# Patient Record
Sex: FEMALE | Race: WHITE | Employment: OTHER | URBAN - METROPOLITAN AREA
[De-identification: names, ages, dates, MRNs, and addresses within clinical notes are randomized per-mention and may not be internally consistent; named-entity substitution may affect disease eponyms.]

---

## 2017-05-31 PROBLEM — N20.0 NEPHROLITHIASIS: Status: ACTIVE | Noted: 2017-05-31

## 2019-06-12 ENCOUNTER — HOSPITAL ENCOUNTER (OUTPATIENT)
Dept: PHYSICAL THERAPY | Age: 69
Setting detail: THERAPIES SERIES
Discharge: HOME | End: 2019-06-12
Payer: MEDICARE

## 2019-06-12 PROCEDURE — 97140 MANUAL THERAPY 1/> REGIONS: CPT

## 2019-06-12 PROCEDURE — 97161 PT EVAL LOW COMPLEX 20 MIN: CPT

## 2019-06-12 PROCEDURE — 97110 THERAPEUTIC EXERCISES: CPT

## 2019-06-12 NOTE — FLOWSHEET NOTE
Outpatient Physical Therapy     [] Daily Treatment Note   [] Progress Note   [] Discharge Note      Date:  2019    Patient Name:  Brendan Wright        :  1950    Restrictions/Precautions:      Diagnosis:  Rot cuff repair. Massive 3/14/19    Treatment Diagnosis:      Insurance/Certification information:  Medicare and first choice    Referring Physician:      Plan of care signed (Y/N):      Visit# / total visits:  1    Pain level: /10     Subjective:   19 reports she is able to do most things that she wants to do at home. She is not able to reach very high. She is just starting to be able to lay on her left side for a short time. She has not been lifting with her left side. She is able to carry a bag of groceries but does not put them on the counter. She is able to mow 10 acres with the zero turn mower.     Pain    Patient describes pain to be very intermit. Sometimes she has some pain when she moves the wrong way. Patient reports 0 /10 pain at rest,  5-6/10 pain with movement, seldom, pain is only for a few seconds. .   Worsened by -  Lifting, reaching too high   Improved by - avoidance  Current Functional Limitations:  Cant lift much or reach too high  PLOF:  indep  Pt. unable to tolerate laying on side of pain, fixing hair, reaching overhead, washing opposite shoulder, nor tucking in shirt due to pain.     Patient goal for therapy:   Reach higher, lift more, get stronger.       Exercises:   Exercise/Equipment Resistance/Repetitions Other comments     19 explained course and role of PT       Scapular ex- center sqz and back pocket shrugs Sets of 10      Wand ex for stretching:                         flexion 3x5              ER from forehead to pillow       Prone sh ext 3x5      Prone row 3x5                                                                                             Therapeutic Exercise:  30 min    Group Therapy:      Home Exercise Program:  Given HO    Therapeutic Activity:      Neuromuscular Re-education:      Gait:      Manual Therapy:  PROM, inf glide, IR/ER spins    Canalith Repositioning Procedure:       Modalities:  CP x 10 min    Timed Code Treatment Minutes:  45    Total Treatment Minutes:  70    Medicare Cap Total YTD:  170.00    Treatment/Activity Tolerance:    [x] Patient tolerated treatment well [] Patient limited by fatigue   [] Patient limited by pain [] Patient limited by other medical complications   [] Other:     Prognosis: [x] Good [] Fair  [] Poor    Patient Requires Follow-up:  [x] Yes  [] No    Plan: [x] Continue per plan of care [] Alter current plan (see comments)   [] Plan of care initiated [] Hold pending MD visit [] Discharge    Plan for Next Session:  Manual tech, PRES , stretching for ROM  See Progress Note: [] Yes  [x] No       Next due:         Electronically signed by:  Mitali Luo PT [id number]      Outpatient Physical Therapy  Progress Note    Date:  2019    Patient Name:  Jeremías Ernst      :  1950    Restrictions/Precautions:      Diagnosis:      Treatment Diagnosis:      Insurance/Certification information:      Referring Physician:      Plan of care signed (Y/N):      Visit# / total visits:  /    Pain level: /10    Progress Note covers period from: To date on this note.       Subjective:         Objective:  Observation:  Test measurements:       GCODEs and Functional Outcome Measure:            Assessment:  Summary:   Patient's response to treatment:      Goals:  · Progress toward previous goals:      Plan:  ·     Electronically Signed by: Mitali Luo PT

## 2019-06-12 NOTE — PROGRESS NOTES
expected. Prognosis: [x]    Good []    Fair []    Poor    GOALS  GCode:    Short Term Goals (  4  weeks) Long Term Goals ( 8  weeks)   1). Establish HEP 1). (I) HEP   2). pain scale 0-3/10 2). pain scale 0-3/10   3). AROM:  145 flx/abd  ER to T2 3). AROM to 155 flx   4). strength to 4/5. 4). strength to 4+/5   5). improve use 50% 5). improve use 75%   6). 6). PLAN OF CARE    To see patient  2  x/week for  4-8  weeks for the following treatment interventions:   Therapeutic Exercise   Progressive Resistive Exercise   Modalities of Choice (Heat/Cold/US/EStim/Ionto)   Home Exercise Program   Manual Techniques/Mobilization   Postural Reeducation    Thank you for the referral of this patient.       Timed Code Treatment Minutes:  45 minutes      Total Treatment Time: 70 minutes    Carmelina Kehr PT license #[id number]

## 2019-06-17 ENCOUNTER — HOSPITAL ENCOUNTER (OUTPATIENT)
Dept: PHYSICAL THERAPY | Age: 69
Setting detail: THERAPIES SERIES
Discharge: HOME | End: 2019-06-17
Payer: MEDICARE

## 2019-06-17 NOTE — FLOWSHEET NOTE
Physical Therapy  Cancellation/No-show Note  Patient Name:  Stephenie Whitmore  :  1950   Date:  2019  Cancels to Date: 1     For today  and .   No-shows to Date: 0    For today's appointment patient:  [x]  Cancelled  []  Rescheduled appointment  []  No-show     Reason given by patient:  []  Patient ill  []  Conflicting appointment  []  No transportation    []  Conflict with work  []  No reason given  []  Other:     Comments:      Electronically signed by:  Stella Fernandez, [id number]

## 2019-06-19 ENCOUNTER — APPOINTMENT (OUTPATIENT)
Dept: PHYSICAL THERAPY | Age: 69
End: 2019-06-19
Payer: MEDICARE

## 2019-06-26 ENCOUNTER — HOSPITAL ENCOUNTER (OUTPATIENT)
Dept: PHYSICAL THERAPY | Age: 69
Setting detail: THERAPIES SERIES
Discharge: HOME | End: 2019-06-26
Payer: MEDICARE

## 2019-06-26 PROCEDURE — 97150 GROUP THERAPEUTIC PROCEDURES: CPT

## 2019-06-26 PROCEDURE — 97140 MANUAL THERAPY 1/> REGIONS: CPT

## 2019-06-26 PROCEDURE — 97110 THERAPEUTIC EXERCISES: CPT

## 2019-07-01 ENCOUNTER — HOSPITAL ENCOUNTER (OUTPATIENT)
Dept: PHYSICAL THERAPY | Age: 69
Setting detail: THERAPIES SERIES
Discharge: HOME | End: 2019-07-01
Payer: MEDICARE

## 2019-07-01 PROCEDURE — 97140 MANUAL THERAPY 1/> REGIONS: CPT

## 2019-07-01 PROCEDURE — 97110 THERAPEUTIC EXERCISES: CPT

## 2019-07-01 NOTE — PROGRESS NOTES
Outpatient Physical Therapy     [x] Daily Treatment Note   [x] Progress Note 19, bottom of this page. [] Discharge Note      Date:  2019    Patient Name:  Simone Cage        :  1950    Restrictions/Precautions:  none    Diagnosis:  Left Rot cuff repair. Massive 3/14/19    Treatment Diagnosis:  Same. Sh pain, decr functional use    Insurance/Certification information:  Medicare and first choice    Referring Physician:  Dr. France Prsabinant of care signed (Y/N):      Visit# / total visits:  4.        schedualed 1x wk. Pain level: 0/10 currently,   3-4/10 at worst     Subjective:   19 states she has done some shoveling and raking, kept her arm close to her body. 15.5 weeks post op   19  Reports she is sore today with certain movements in shoulder joint     Reports she is very active at home  19  Patient reports L shoulder is feeling really good. Only having pain with lifting light objects around the house. No reports of L shoulder pain that prevents sleeping. Has not been compliant with HEP due to being busy with house guests. Also, a little sore today for moving wood at her home. 19 reports she is able to do most things that she wants to do at home. She is not able to reach very high. She is just starting to be able to lay on her left side for a short time. She has not been lifting with her left side. She is able to carry a bag of groceries but does not put them on the counter. She is able to mow 10 acres with the zero turn mower.     Pain    Patient describes pain to be very intermit. Sometimes she has some pain when she moves the wrong way. Patient reports 0 /10 pain at rest,  5-6/10 pain with movement, seldom, pain is only for a few seconds. .   Worsened by -  Lifting, reaching too high   Improved by - avoidance  Current Functional Limitations:  Cant lift much or reach too high  PLOF:  indep  Pt. unable to tolerate laying on side of pain, fixing hair, reaching overhead, washing opposite shoulder, nor tucking in shirt due to pain.     Patient goal for therapy:   Reach higher, lift more, get stronger.     14 weeks post surgery on 6/20/19    PROM:  L shoulder flex 150, abd 170, ER 72, IR 75 in 90 deg abd    Exercises:   7/1/19 is 15.5 weeks post op. Massive tear. Needs ROM/ strength/stability  Exercise/Equipment Resistance/Repetitions Other comments     6/12/19 explained course and role of PT       Scapular ex- center sqz and back pocket shrugs Sets of 10      Wand ex for stretching:                         Flexion, abd, ER x5 each direction              ER from forehead to pillow       Prone sh ext 2x15, 1#      Prone row 2x15, 1#     rhythmic stabilization  x1 min in supine with shoulder flex to 90 deg, manual resistance      PNF, D1,2 x10 with light manual resistance        Figure 8 X10, in supine, 1#       Ceiling punches 2x10, 1#      Supine chest press X10, 1#     7/1/19 added PNF in supine to HEP and table slide flx sstretch to stading on her kitchen islnd        incr prone ex to 2 then 3#       NV- t bd for mid.low, IR,ER. ER on her side with 1# if abale. Therapeutic Exercise:   15 min. Reviewed and progressed exercises as noted above with new handout given. Group Therapy:        Home Exercise Program:  Given HO    Therapeutic Activity:      Neuromuscular Re-education:      Gait:      Manual Therapy: x11 min. PROM, inf glide, IR/ER spins    Canalith Repositioning Procedure:       Modalities:  Declined      Timed Code Treatment Minutes:        Total Treatment Minutes:  13         1 man 1  Ex     Medicare Cap Total YTD:   325.00    Treatment/Activity Tolerance:    [x] Patient tolerated treatment well with no adverse reactions noted [] Patient limited by fatigue   [] Patient limited by pain [] Patient limited by other medical complications   [x] Other:  Pain 0/10 after treatment    Prognosis: [x] Good [] Fair  []

## 2019-07-03 ENCOUNTER — HOSPITAL ENCOUNTER (OUTPATIENT)
Dept: PHYSICAL THERAPY | Age: 69
Setting detail: THERAPIES SERIES
Discharge: HOME | End: 2019-07-03
Payer: MEDICARE

## 2019-07-03 PROCEDURE — 97140 MANUAL THERAPY 1/> REGIONS: CPT

## 2019-07-03 PROCEDURE — 97110 THERAPEUTIC EXERCISES: CPT

## 2019-07-03 PROCEDURE — 97150 GROUP THERAPEUTIC PROCEDURES: CPT

## 2019-07-08 ENCOUNTER — HOSPITAL ENCOUNTER (OUTPATIENT)
Dept: PHYSICAL THERAPY | Age: 69
Setting detail: THERAPIES SERIES
Discharge: HOME | End: 2019-07-08
Payer: MEDICARE

## 2019-07-08 NOTE — FLOWSHEET NOTE
Physical Therapy  Cancellation/No-show Note  Patient Name:  Zheng Underwood  :  1950   Date:  2019  Cancels to Date: 2  No-shows to Date: 0    For today's appointment patient:  [x]  Cancelled  []  Rescheduled appointment  []  No-show     Reason given by patient:  []  Patient ill  []  Conflicting appointment  []  No transportation    []  Conflict with work  []  No reason given  []  Other:     Comments:  Having N/T in her arm so she is going to MD  Electronically signed byHetal Villagran, [id number]

## 2019-07-10 ENCOUNTER — HOSPITAL ENCOUNTER (OUTPATIENT)
Dept: PHYSICAL THERAPY | Age: 69
Setting detail: THERAPIES SERIES
Discharge: HOME | End: 2019-07-10
Payer: MEDICARE

## 2019-07-10 PROCEDURE — 97150 GROUP THERAPEUTIC PROCEDURES: CPT

## 2019-07-10 PROCEDURE — 97140 MANUAL THERAPY 1/> REGIONS: CPT

## 2019-07-10 NOTE — FLOWSHEET NOTE
Outpatient Physical Therapy     [x] Daily Treatment Note   [] Progress Note 19, bottom of this page. [] Discharge Note      Date:  7/10/2019    Patient Name:  Meme Carranza        :  1950    Restrictions/Precautions:  none    Diagnosis:  Left Rot cuff repair. Massive 3/14/19    Treatment Diagnosis:  Same. Sh pain, decr functional use    Insurance/Certification information:  Medicare and first choice    Referring Physician:  Dr. Geovany Matamoros of care signed (Y/N):      Visit# / total visits:  6        Scheduled 1x wk. Pain level: 1/10 currently,   2/10 at worst W/N/T    Subjective:    7/10/19 reports no n/t today but upper arm is really sore. She said she saw the MD and he thinks she just pinched a nerve in neck or elbow. Reports she did lift a heavy casarole dish into shelf over the weekend. 7/3/19   7/1/19 states she has done some shoveling and raking, kept her arm close to her body. 15.5 weeks post op  19  Reports she is sore today with certain movements in shoulder joint     Reports she is very active at home  19  Patient reports L shoulder is feeling really good. Only having pain with lifting light objects around the house. No reports of L shoulder pain that prevents sleeping. Has not been compliant with HEP due to being busy with house guests. Also, a little sore today for moving wood at her home. 19 reports she is able to do most things that she wants to do at home. She is not able to reach very high. She is just starting to be able to lay on her left side for a short time. She has not been lifting with her left side. She is able to carry a bag of groceries but does not put them on the counter. She is able to mow 10 acres with the zero turn mower.     Pain    Patient describes pain to be very intermit. Sometimes she has some pain when she moves the wrong way.   Patient reports 0 /10 pain at rest,  5-6/10 pain with movement, seldom, pain is only for a few needs to continue PT 1x wk for another 4-6 weeks for ROM and strength.   ·     Electronically Signed by:  Calderon Bal Y Yehuda [phone]

## 2019-07-15 ENCOUNTER — HOSPITAL ENCOUNTER (OUTPATIENT)
Dept: GENERAL RADIOLOGY | Age: 69
Discharge: HOME | End: 2019-07-15
Payer: MEDICARE

## 2019-07-15 ENCOUNTER — HOSPITAL ENCOUNTER (OUTPATIENT)
Dept: PHYSICAL THERAPY | Age: 69
Setting detail: THERAPIES SERIES
Discharge: HOME | End: 2019-07-15
Payer: MEDICARE

## 2019-07-15 DIAGNOSIS — N20.0 KIDNEY STONE: ICD-10-CM

## 2019-07-15 PROCEDURE — 97140 MANUAL THERAPY 1/> REGIONS: CPT

## 2019-07-15 PROCEDURE — 97110 THERAPEUTIC EXERCISES: CPT

## 2019-07-15 PROCEDURE — 74018 RADEX ABDOMEN 1 VIEW: CPT

## 2019-07-15 NOTE — FLOWSHEET NOTE
Outpatient Physical Therapy     [x] Daily Treatment Note   [] Progress Note 19, bottom of this page. [] Discharge Note      Date:  7/15/2019    Patient Name:  Damaris Palmer        :  1950    Restrictions/Precautions:  none    Diagnosis:  Left Rot cuff repair. Massive 3/14/19    Treatment Diagnosis:  Same. Sh pain, decr functional use    Insurance/Certification information:  Medicare and first choice    Referring Physician:  Dr. Paul Elders of care signed (Y/N):      Visit# / total visits:  7        Scheduled 1-2 x wk. Pain level: 1/10 currently,   2/10 at worst W/N/T    Subjective:    7/15/19 doing well. Has a tender/sore area on lateral left arm ( no pain with activity or with resistance with abd or ext). She will not poke it this week and see if it improves, and try not to sleep on her left side. 7/10/19 reports no n/t today but upper arm is really sore. She said she saw the MD and he thinks she just pinched a nerve in neck or elbow. Reports she did lift a heavy casarole dish into shelf over the weekend. 7/3/19   7/1/19 states she has done some shoveling and raking, kept her arm close to her body. 15.5 weeks post op  19  Reports she is sore today with certain movements in shoulder joint     Reports she is very active at home  19  Patient reports L shoulder is feeling really good. Only having pain with lifting light objects around the house. No reports of L shoulder pain that prevents sleeping. Has not been compliant with HEP due to being busy with house guests. Also, a little sore today for moving wood at her home. 19 reports she is able to do most things that she wants to do at home. She is not able to reach very high. She is just starting to be able to lay on her left side for a short time. She has not been lifting with her left side. She is able to carry a bag of groceries but does not put them on the counter.  She is able to mow 10 acres with the zero turn

## 2019-07-17 ENCOUNTER — HOSPITAL ENCOUNTER (OUTPATIENT)
Dept: PHYSICAL THERAPY | Age: 69
Setting detail: THERAPIES SERIES
Discharge: HOME | End: 2019-07-17
Payer: MEDICARE

## 2019-07-22 ENCOUNTER — HOSPITAL ENCOUNTER (OUTPATIENT)
Dept: PHYSICAL THERAPY | Age: 69
Setting detail: THERAPIES SERIES
Discharge: HOME | End: 2019-07-22
Payer: MEDICARE

## 2019-07-22 PROCEDURE — 97110 THERAPEUTIC EXERCISES: CPT

## 2019-07-22 PROCEDURE — 97140 MANUAL THERAPY 1/> REGIONS: CPT

## 2019-07-22 NOTE — FLOWSHEET NOTE
Outpatient Physical Therapy     [x] Daily Treatment Note   [] Progress Note 19, bottom of this page. [] Discharge Note      Date:  2019    Patient Name:  Shawn Irizarry        :  1950    Restrictions/Precautions:  none    Diagnosis:  Left Rot cuff repair. Massive 3/14/19    Treatment Diagnosis:  Same. Sh pain, decr functional use    Insurance/Certification information:  Medicare and first choice    Referring Physician:  Dr. Ian Nassar of care signed (Y/N):      Visit# / total visits:  8        Next prog note 19 or       Scheduled 1-2 x wk. Pain level: 1/10 currently,   2/10 at worst W/N/T    Subjective:    7/15/19 doing well. Has a tender/sore area on lateral left arm ( no pain with activity or with resistance with abd or ext). She will not poke it this week and see if it improves, and try not to sleep on her left side. 7/10/19 reports no n/t today but upper arm is really sore. She said she saw the MD and he thinks she just pinched a nerve in neck or elbow. Reports she did lift a heavy casarole dish into shelf over the weekend. 7/3/19   7/1/19 states she has done some shoveling and raking, kept her arm close to her body. 15.5 weeks post op  19  Reports she is sore today with certain movements in shoulder joint     Reports she is very active at home  19  Patient reports L shoulder is feeling really good. Only having pain with lifting light objects around the house. No reports of L shoulder pain that prevents sleeping. Has not been compliant with HEP due to being busy with house guests. Also, a little sore today for moving wood at her home. 19 reports she is able to do most things that she wants to do at home. She is not able to reach very high. She is just starting to be able to lay on her left side for a short time. She has not been lifting with her left side. She is able to carry a bag of groceries but does not put them on the counter.  She is able to 110 abd     IR to L4     ER to T1. Strength: 3+ to 40       Functional Outcome Measure:        Assessment:  Summary: making steady gains. Patient's response to treatment: compliant     Goals:     Short Term Goals (  4  weeks) Long Term Goals ( 8  weeks)   1). Establish HEP 1). (I) HEP   2). pain scale 0-3/10 2). pain scale 0-3/10   3). AROM:  145 flx/abd  ER to T2 3). AROM to 155 flx   4). strength to 4/5. 4). strength to 4+/5   5). improve use 50% 5). improve use 75%   6). 6).            · Progress toward previous goals: she has had 3 weeks of therapy. STG 1,2,5 met. · Working toward other goals. Plan: needs to continue PT 1x wk for another 4-6 weeks for ROM and strength.   ·     Electronically Signed by:  Calderon Fremin [phone]

## 2019-07-29 ENCOUNTER — HOSPITAL ENCOUNTER (OUTPATIENT)
Dept: PHYSICAL THERAPY | Age: 69
Setting detail: THERAPIES SERIES
Discharge: HOME | End: 2019-07-29
Payer: MEDICARE

## 2019-07-29 PROCEDURE — 97140 MANUAL THERAPY 1/> REGIONS: CPT

## 2019-07-29 PROCEDURE — 97110 THERAPEUTIC EXERCISES: CPT

## 2019-07-31 ENCOUNTER — HOSPITAL ENCOUNTER (OUTPATIENT)
Dept: PHYSICAL THERAPY | Age: 69
Setting detail: THERAPIES SERIES
Discharge: HOME | End: 2019-07-31
Payer: MEDICARE

## 2019-07-31 PROCEDURE — 97016 VASOPNEUMATIC DEVICE THERAPY: CPT

## 2019-07-31 PROCEDURE — 97140 MANUAL THERAPY 1/> REGIONS: CPT

## 2019-07-31 PROCEDURE — 97110 THERAPEUTIC EXERCISES: CPT

## 2019-07-31 NOTE — FLOWSHEET NOTE
Outpatient Physical Therapy     [x] Daily Treatment Note   [] Progress Note 19, bottom of this page. ,  19    [] Discharge Note      Date:  2019    Patient Name:  Sary Leon        :  1950    Restrictions/Precautions:  none    Diagnosis:  Left Rot cuff repair. Massive 3/14/19    Treatment Diagnosis:  Same. Sh pain, decr functional use    Insurance/Certification information:  Medicare and first choice    Referring Physician:  Dr. Sindi Pratt of care signed (Y/N):      Visit# / total visits:   10                Pain level: 1/10 currently,   2/10 at worst W/N/T    Subjective:   19 has been busy with planlting grass and spreading straw. Her shoulder is more sore. 19 reports she is back to 70% use. See prog note   7/15/19 doing well. Has a tender/sore area on lateral left arm ( no pain with activity or with resistance with abd or ext). She will not poke it this week and see if it improves, and try not to sleep on her left side. 7/10/19 reports no n/t today but upper arm is really sore. She said she saw the MD and he thinks she just pinched a nerve in neck or elbow. Reports she did lift a heavy casarole dish into shelf over the weekend. 7/3/19   7/1/19 states she has done some shoveling and raking, kept her arm close to her body. 15.5 weeks post op  19  Reports she is sore today with certain movements in shoulder joint     Reports she is very active at home  19  Patient reports L shoulder is feeling really good. Only having pain with lifting light objects around the house. No reports of L shoulder pain that prevents sleeping. Has not been compliant with HEP due to being busy with house guests. Also, a little sore today for moving wood at her home. 19 reports she is able to do most things that she wants to do at home. She is not able to reach very high. She is just starting to be able to lay on her left side for a short time.   She has not been lifting with her left side. She is able to carry a bag of groceries but does not put them on the counter. She is able to mow 10 acres with the zero turn mower.     Pain    Patient describes pain to be very intermit. Sometimes she has some pain when she moves the wrong way. Patient reports 0 /10 pain at rest,  5-6/10 pain with movement, seldom, pain is only for a few seconds. .   Worsened by -  Lifting, reaching too high   Improved by - avoidance  Current Functional Limitations:  Cant lift much or reach too high  PLOF:  indep  Pt. unable to tolerate laying on side of pain, fixing hair, reaching overhead, washing opposite shoulder, nor tucking in shirt due to pain.     Patient goal for therapy:   Reach higher, lift more, get stronger.     14 weeks post surgery on 6/20/19        17 weeks post surgery 7/12/19    PROM:  L shoulder flex 150, abd 170, ER 72, IR 75 in 90 deg abd    Exercises:   7/1/19 is 15.5 weeks post op. Massive tear. Needs ROM/ strength/stability  Exercise/Equipment Resistance/Repetitions Other comments     6/12/19 explained course and role of PT       Scapular ex- center sqz and back pocket shrugs Sets of 10      Wand ex for stretching:                         Flexion, abd, ER x5 each direction              ER from forehead to pillow       Prone sh ext 2x15, 1#  2# nv      Prone row 2x15, 1#  2# nv     rhythmic stabilization  x1 min in supine with shoulder flex to 90 deg, manual resistance      PNF, D1,2 x10 with light manual resistance        Figure 8 X10, in supine, 1#       Ceiling punches 2x10, 1#      Supine chest press 2X10, 1#     7/1/19 added PNF in supine to HEP and table slide flx sstretch to stading on her kitchen islnd        incr prone ex to 2 then 3#     SL ER 1# 2x10    mid and low rows  red 3x10    IR     red 2x10     7/12/19 incr to red. PNF in standing with 1#  2x5 resp due to difficulty in standing.      NV- try to start pulley program.      7/22/19-7/29/19  pulleys: Low rows 7.5 moderate. activity  Has some pain with an unusual movement. Objective:  Observation:  Test measurements:  PROM:  150 flx      85 ER              AROM:  130 flx  4-/5.     60 ext 4+/5 .      118 abd  3+/5. IR to T12  4-/5                    ER to T1/T2 3+ to 4-/5. Functional Outcome Measure:        Assessment:  Summary: making steady gains. Patient's response to treatment: compliant     Goals:     Short Term Goals (  4  weeks) Long Term Goals ( 8  weeks)   1). Establish HEP 1). (I) HEP   2). pain scale 0-3/10 2). pain scale 0-3/10   3). AROM:  145 flx/abd  ER to T2 3). AROM to 155 flx   4). strength to 4/5. 4). strength to 4+/5   5). improve use 50% 5). improve use 75%   6). 6).            · Progress toward previous goals:   STG 1,2,3,5 met. Strength is lacking. · Working toward other goals. Plan: needs to continue PT 2x wk for another  2.5weeks for ROM and strength.   ·     Electronically Signed by:  Calderon Roberts [phone]

## 2019-08-05 ENCOUNTER — HOSPITAL ENCOUNTER (OUTPATIENT)
Dept: PHYSICAL THERAPY | Age: 69
Setting detail: THERAPIES SERIES
Discharge: HOME | End: 2019-08-05
Payer: MEDICARE

## 2019-08-05 PROCEDURE — 97140 MANUAL THERAPY 1/> REGIONS: CPT

## 2019-08-05 PROCEDURE — 97110 THERAPEUTIC EXERCISES: CPT

## 2019-08-07 ENCOUNTER — HOSPITAL ENCOUNTER (OUTPATIENT)
Dept: PHYSICAL THERAPY | Age: 69
Setting detail: THERAPIES SERIES
Discharge: HOME | End: 2019-08-07
Payer: MEDICARE

## 2019-08-07 PROCEDURE — 97110 THERAPEUTIC EXERCISES: CPT

## 2019-08-07 PROCEDURE — 97140 MANUAL THERAPY 1/> REGIONS: CPT

## 2019-08-07 NOTE — FLOWSHEET NOTE
short time. She has not been lifting with her left side. She is able to carry a bag of groceries but does not put them on the counter. She is able to mow 10 acres with the zero turn mower.     Pain    Patient describes pain to be very intermit. Sometimes she has some pain when she moves the wrong way. Patient reports 0 /10 pain at rest,  5-6/10 pain with movement, seldom, pain is only for a few seconds. .   Worsened by -  Lifting, reaching too high   Improved by - avoidance  Current Functional Limitations:  Cant lift much or reach too high  PLOF:  indep  Pt. unable to tolerate laying on side of pain, fixing hair, reaching overhead, washing opposite shoulder, nor tucking in shirt due to pain.     Patient goal for therapy:   Reach higher, lift more, get stronger.     14 weeks post surgery on 6/20/19        17 weeks post surgery 7/12/19    PROM:  L shoulder flex 150, abd 170, ER 72, IR 75 in 90 deg abd    Exercises:   7/1/19 is 15.5 weeks post op. Massive tear. Needs ROM/ strength/stability  Exercise/Equipment Resistance/Repetitions Other comments     6/12/19 explained course and role of PT       Scapular ex- center sqz and back pocket shrugs Sets of 10      Wand ex for stretching:                         Flexion, abd, ER x5 each direction              ER from forehead to pillow       Prone sh ext 2x15, 1#  2# nv      Prone row 2x15, 1#  2# nv     rhythmic stabilization  x1 min in supine with shoulder flex to 90 deg, manual resistance      PNF, D1,2 x10 with light manual resistance        Figure 8 X10, in supine, 1#       Ceiling punches 2x10, 1#      Supine chest press 2X10, 1#     7/1/19 added PNF in supine to HEP and table slide flx sstretch to stading on her kitchen islnd        incr prone ex to 2 then 3#     SL ER 1# 2x10    mid and low rows  red 3x10    IR     red 2x10     7/12/19 incr to red. PNF in standing with 1#  2x5 resp due to difficulty in standing.      NV- try to start pulley program.      7/22/19- To moderate. activity  Has some pain with an unusual movement. Objective:  Observation:  Test measurements:  PROM:  150 flx      85 ER              AROM:  130 flx  4-/5.     60 ext 4+/5 .      118 abd  3+/5. IR to T12  4-/5                    ER to T1/T2 3+ to 4-/5. Functional Outcome Measure:        Assessment:  Summary: making steady gains. Patient's response to treatment: compliant     Goals:     Short Term Goals (  4  weeks) Long Term Goals ( 8  weeks)   1). Establish HEP 1). (I) HEP   2). pain scale 0-3/10 2). pain scale 0-3/10   3). AROM:  145 flx/abd  ER to T2 3). AROM to 155 flx   4). strength to 4/5. 4). strength to 4+/5   5). improve use 50% 5). improve use 75%   6). 6).            · Progress toward previous goals:   STG 1,2,3,5 met. Strength is lacking. · Working toward other goals. Plan: needs to continue PT 2x wk for another  2.5weeks for ROM and strength.   ·     Electronically Signed by:  Calderon Navarro [phone]

## 2019-08-12 ENCOUNTER — HOSPITAL ENCOUNTER (OUTPATIENT)
Dept: PHYSICAL THERAPY | Age: 69
Setting detail: THERAPIES SERIES
Discharge: HOME | End: 2019-08-12
Payer: MEDICARE

## 2019-08-12 PROCEDURE — 97110 THERAPEUTIC EXERCISES: CPT

## 2019-08-12 PROCEDURE — 97140 MANUAL THERAPY 1/> REGIONS: CPT

## 2022-03-01 ENCOUNTER — HOSPITAL ENCOUNTER (EMERGENCY)
Age: 72
Discharge: HOME | End: 2022-03-02
Payer: MEDICARE

## 2022-03-01 DIAGNOSIS — R10.84 GENERALIZED ABDOMINAL PAIN: Primary | ICD-10-CM

## 2022-03-01 DIAGNOSIS — K56.7 ILEUS (HCC): ICD-10-CM

## 2022-03-01 DIAGNOSIS — N28.1 RENAL CYST: ICD-10-CM

## 2022-03-01 LAB
A/G RATIO: 1.3 (ref 1.1–2.2)
ALBUMIN SERPL-MCNC: 4.3 G/DL (ref 3.4–5)
ALP BLD-CCNC: 94 U/L (ref 40–129)
ALT SERPL-CCNC: 17 U/L (ref 10–40)
ANION GAP SERPL CALCULATED.3IONS-SCNC: 11 MMOL/L (ref 3–16)
AST SERPL-CCNC: 20 U/L (ref 15–37)
BASOPHILS ABSOLUTE: 0 K/UL (ref 0–0.2)
BASOPHILS RELATIVE PERCENT: 0.5 %
BILIRUB SERPL-MCNC: 0.3 MG/DL (ref 0–1)
BILIRUBIN URINE: NEGATIVE
BLOOD, URINE: NEGATIVE
BUN BLDV-MCNC: 26 MG/DL (ref 7–20)
CALCIUM SERPL-MCNC: 9.6 MG/DL (ref 8.3–10.6)
CHLORIDE BLD-SCNC: 100 MMOL/L (ref 99–110)
CLARITY: CLEAR
CO2: 26 MMOL/L (ref 21–32)
COLOR: YELLOW
CREAT SERPL-MCNC: 0.7 MG/DL (ref 0.6–1.2)
EOSINOPHILS ABSOLUTE: 0.1 K/UL (ref 0–0.6)
EOSINOPHILS RELATIVE PERCENT: 1 %
GFR AFRICAN AMERICAN: >60
GFR NON-AFRICAN AMERICAN: >60
GLUCOSE BLD-MCNC: 121 MG/DL (ref 70–99)
GLUCOSE URINE: NEGATIVE MG/DL
HCT VFR BLD CALC: 40.8 % (ref 36–48)
HEMOGLOBIN: 13.7 G/DL (ref 12–16)
KETONES, URINE: NEGATIVE MG/DL
LEUKOCYTE ESTERASE, URINE: ABNORMAL
LIPASE: 30 U/L (ref 13–60)
LYMPHOCYTES ABSOLUTE: 0.7 K/UL (ref 1–5.1)
LYMPHOCYTES RELATIVE PERCENT: 8.3 %
MCH RBC QN AUTO: 29.1 PG (ref 26–34)
MCHC RBC AUTO-ENTMCNC: 33.6 G/DL (ref 31–36)
MCV RBC AUTO: 86.6 FL (ref 80–100)
MICROSCOPIC EXAMINATION: YES
MONOCYTES ABSOLUTE: 0.8 K/UL (ref 0–1.3)
MONOCYTES RELATIVE PERCENT: 9.1 %
NEUTROPHILS ABSOLUTE: 6.9 K/UL (ref 1.7–7.7)
NEUTROPHILS RELATIVE PERCENT: 81.1 %
NITRITE, URINE: NEGATIVE
PDW BLD-RTO: 14.7 % (ref 12.4–15.4)
PH UA: 6 (ref 5–8)
PLATELET # BLD: 253 K/UL (ref 135–450)
PMV BLD AUTO: 8 FL (ref 5–10.5)
POTASSIUM SERPL-SCNC: 4 MMOL/L (ref 3.5–5.1)
PROTEIN UA: NEGATIVE MG/DL
RBC # BLD: 4.71 M/UL (ref 4–5.2)
RBC UA: NORMAL /HPF (ref 0–4)
SODIUM BLD-SCNC: 137 MMOL/L (ref 136–145)
SPECIFIC GRAVITY UA: <=1.005 (ref 1–1.03)
TOTAL PROTEIN: 7.5 G/DL (ref 6.4–8.2)
URINE REFLEX TO CULTURE: ABNORMAL
URINE TYPE: ABNORMAL
UROBILINOGEN, URINE: 0.2 E.U./DL
WBC # BLD: 8.5 K/UL (ref 4–11)
WBC UA: NORMAL /HPF (ref 0–5)

## 2022-03-01 PROCEDURE — 83690 ASSAY OF LIPASE: CPT

## 2022-03-01 PROCEDURE — 99283 EMERGENCY DEPT VISIT LOW MDM: CPT

## 2022-03-01 PROCEDURE — 85025 COMPLETE CBC W/AUTO DIFF WBC: CPT

## 2022-03-01 PROCEDURE — 96376 TX/PRO/DX INJ SAME DRUG ADON: CPT

## 2022-03-01 PROCEDURE — 6360000002 HC RX W HCPCS: Performed by: NURSE PRACTITIONER

## 2022-03-01 PROCEDURE — 81001 URINALYSIS AUTO W/SCOPE: CPT

## 2022-03-01 PROCEDURE — 80053 COMPREHEN METABOLIC PANEL: CPT

## 2022-03-01 PROCEDURE — 96374 THER/PROPH/DIAG INJ IV PUSH: CPT

## 2022-03-01 RX ORDER — ONDANSETRON 2 MG/ML
4 INJECTION INTRAMUSCULAR; INTRAVENOUS ONCE
Status: COMPLETED | OUTPATIENT
Start: 2022-03-01 | End: 2022-03-01

## 2022-03-01 RX ADMIN — ONDANSETRON HYDROCHLORIDE 4 MG: 2 INJECTION, SOLUTION INTRAMUSCULAR; INTRAVENOUS at 23:33

## 2022-03-01 ASSESSMENT — PAIN SCALES - GENERAL: PAINLEVEL_OUTOF10: 3

## 2022-03-01 ASSESSMENT — PAIN DESCRIPTION - FREQUENCY: FREQUENCY: CONTINUOUS

## 2022-03-01 ASSESSMENT — PAIN - FUNCTIONAL ASSESSMENT: PAIN_FUNCTIONAL_ASSESSMENT: 0-10

## 2022-03-01 ASSESSMENT — PAIN DESCRIPTION - ORIENTATION: ORIENTATION: LOWER

## 2022-03-01 ASSESSMENT — PAIN DESCRIPTION - DESCRIPTORS: DESCRIPTORS: ACHING

## 2022-03-01 ASSESSMENT — PAIN DESCRIPTION - PAIN TYPE: TYPE: ACUTE PAIN

## 2022-03-01 ASSESSMENT — PAIN DESCRIPTION - LOCATION: LOCATION: ABDOMEN

## 2022-03-02 ENCOUNTER — APPOINTMENT (OUTPATIENT)
Dept: CT IMAGING | Age: 72
End: 2022-03-02
Payer: MEDICARE

## 2022-03-02 VITALS
BODY MASS INDEX: 32.02 KG/M2 | HEART RATE: 84 BPM | SYSTOLIC BLOOD PRESSURE: 122 MMHG | DIASTOLIC BLOOD PRESSURE: 70 MMHG | TEMPERATURE: 97.5 F | WEIGHT: 174 LBS | RESPIRATION RATE: 15 BRPM | OXYGEN SATURATION: 95 % | HEIGHT: 62 IN

## 2022-03-02 PROCEDURE — 6360000002 HC RX W HCPCS: Performed by: NURSE PRACTITIONER

## 2022-03-02 PROCEDURE — 6360000004 HC RX CONTRAST MEDICATION: Performed by: NURSE PRACTITIONER

## 2022-03-02 PROCEDURE — 74177 CT ABD & PELVIS W/CONTRAST: CPT

## 2022-03-02 RX ORDER — ONDANSETRON 4 MG/1
4 TABLET, ORALLY DISINTEGRATING ORAL EVERY 8 HOURS PRN
Qty: 6 TABLET | Refills: 0 | Status: SHIPPED | OUTPATIENT
Start: 2022-03-02

## 2022-03-02 RX ORDER — ONDANSETRON 2 MG/ML
4 INJECTION INTRAMUSCULAR; INTRAVENOUS ONCE
Status: COMPLETED | OUTPATIENT
Start: 2022-03-02 | End: 2022-03-02

## 2022-03-02 RX ADMIN — IOPAMIDOL 75 ML: 755 INJECTION, SOLUTION INTRAVENOUS at 00:07

## 2022-03-02 RX ADMIN — ONDANSETRON HYDROCHLORIDE 4 MG: 2 INJECTION, SOLUTION INTRAMUSCULAR; INTRAVENOUS at 01:55

## 2022-03-02 ASSESSMENT — ENCOUNTER SYMPTOMS
RHINORRHEA: 0
ABDOMINAL PAIN: 1
VOMITING: 0
SHORTNESS OF BREATH: 0
BLOOD IN STOOL: 0
COUGH: 0
BACK PAIN: 0
EYE PAIN: 0
SORE THROAT: 0
DIARRHEA: 0
NAUSEA: 1

## 2022-03-02 NOTE — ED PROVIDER NOTES
[address] ED  EMERGENCY DEPARTMENT ENCOUNTER        Pt Name: Audra Mejia  MRN: [id number]  [place] 1950  Date of evaluation: 3/1/2022  Provider: RUFINO Ayala - ROBERT  PCP: Reed Dobson PA-C  Note Started: 12:51 AM EST      ÓSCAR. I have evaluated this patient. My supervising physician was available for consultation. Triage CHIEF COMPLAINT       Chief Complaint   Patient presents with    Abdominal Pain     c/o lower abdominal/pelivc pain and nausea starting tonight; lower back pain; HX of kidney stone          HISTORY OF PRESENT ILLNESS   (Location/Symptom, Timing/Onset, Context/Setting, Quality, Duration, Modifying Factors, Severity)  Note limiting factors. Chief Complaint: Abdominal pain and flank pain     Audra Mejia is a 71 y.o. female who presents to the ER with symptoms of abdominal pain and flank pain. Reported symptoms of discomfort began in flank x 5 days ago and abdominal discomfort began today. States she is concerned for possible UTI. No fever. No symptoms of sob or chest pain. No vomiting, but does have nausea. Pain described as an ache. Nursing Notes were all reviewed and agreed with or any disagreements were addressed in the HPI. REVIEW OF SYSTEMS    (2-9 systems for level 4, 10 or more for level 5)     Review of Systems   Constitutional: Negative for chills, diaphoresis and fever. HENT: Negative for congestion, ear pain, rhinorrhea and sore throat. Eyes: Negative for pain and visual disturbance. Respiratory: Negative for cough and shortness of breath. Cardiovascular: Negative for chest pain and leg swelling. Gastrointestinal: Positive for abdominal pain and nausea. Negative for blood in stool, diarrhea and vomiting. Genitourinary: Positive for flank pain. Negative for difficulty urinating, dysuria and frequency. Musculoskeletal: Negative for back pain and neck pain. Skin: Negative for rash and wound.    Neurological: Negative for dizziness and light-headedness. PAST MEDICAL HISTORY     Past Medical History:   Diagnosis Date    Basal cell carcinoma 2016    nasal     GERD (gastroesophageal reflux disease)     Glaucoma     History of bleeding ulcers 1970's    admitted    Hyperlipidemia     Hypertension     Hypothyroidism     Kidney stone     Osteoarthritis     left knee    Postmenopausal        SURGICAL HISTORY     Past Surgical History:   Procedure Laterality Date    APPENDECTOMY      CHOLECYSTECTOMY  2008    COLONOSCOPY  10/24/2012    diverticulosis    CYSTOSCOPY Right 06/01/2017    right ureteroscopy, stone basket extraction, right ureteral stent    JOINT REPLACEMENT      KNEE ARTHROSCOPY      left    SHOULDER ARTHROSCOPY      right    TONSILLECTOMY AND ADENOIDECTOMY      UPPER GASTROINTESTINAL ENDOSCOPY  10/24/2012    EGD- normal       CURRENTMEDICATIONS       Discharge Medication List as of 3/2/2022  2:04 AM      CONTINUE these medications which have NOT CHANGED    Details   potassium citrate (UROCIT-K) 5 MEQ (540 MG) extended release tablet Take 5 mEq by mouth 3 times daily (with meals)Historical Med      atorvastatin (LIPITOR) 40 MG tablet Take 40 mg by mouth dailyHistorical Med      dicyclomine (BENTYL) 10 MG capsule Take 1 capsule by mouth every 6 hours as needed (cramps), Disp-20 capsule, R-0Print      ondansetron (ZOFRAN) 4 MG tablet Take 1 tablet by mouth every 8 hours as needed for Nausea or Vomiting, Disp-12 tablet, R-0Print      losartan (COZAAR) 50 MG tablet Take 50 mg by mouth dailyHistorical Med      lansoprazole (PREVACID) 30 MG capsule Take 30 mg by mouth daily      SYNTHROID 100 MCG tablet TAKE 1 TABLET DAILY, Disp-90 tablet, R-2, INEZ      travoprost, benzalkonium, (TRAVATAN) 0.004 % ophthalmic solution Place 1 drop into both eyes nightly.              ALLERGIES     Lisinopril and Vicodin [hydrocodone-acetaminophen]    FAMILYHISTORY       Family History   Problem Relation Age of Onset    Heart Disease Father         CAD onset 61    Stroke Father         CVA    Cancer Father         Bladder    Heart Disease Brother         CAD/MI onset 52's    Stroke Mother     High Cholesterol Mother     High Blood Pressure Mother     Heart Disease Paternal Uncle         SOCIAL HISTORY       Social History     Socioeconomic History    Marital status:      Spouse name: None    Number of children: None    Years of education: None    Highest education level: None   Occupational History    None   Tobacco Use    Smoking status: Never Smoker    Smokeless tobacco: Never Used   Substance and Sexual Activity    Alcohol use: No     Comment: rarely    Drug use: No    Sexual activity: None   Other Topics Concern    None   Social History Narrative    None     Social Determinants of Health     Financial Resource Strain:     Difficulty of Paying Living Expenses: Not on file   Food Insecurity:     Worried About Running Out of Food in the Last Year: Not on file    Petra of Food in the Last Year: Not on file   Transportation Needs:     Lack of Transportation (Medical): Not on file    Lack of Transportation (Non-Medical):  Not on file   Physical Activity:     Days of Exercise per Week: Not on file    Minutes of Exercise per Session: Not on file   Stress:     Feeling of Stress : Not on file   Social Connections:     Frequency of Communication with Friends and Family: Not on file    Frequency of Social Gatherings with Friends and Family: Not on file    Attends Shinto Services: Not on file    Active Member of Clubs or Organizations: Not on file    Attends Club or Organization Meetings: Not on file    Marital Status: Not on file   Intimate Partner Violence:     Fear of Current or Ex-Partner: Not on file    Emotionally Abused: Not on file    Physically Abused: Not on file    Sexually Abused: Not on file   Housing Stability:     Unable to Pay for Housing in the Last Year: Not on file    Number of Places Lived in the Last Year: Not on file    Unstable Housing in the Last Year: Not on file       SCREENINGS    [place] Coma Scale  Eye Opening: Spontaneous  Best Verbal Response: Oriented  Best Motor Response: Obeys commands  [place] Coma Scale Score: 15        PHYSICAL EXAM    (up to 7 for level 4, 8 or more for level 5)     ED Triage Vitals   BP Temp Temp Source Pulse Resp SpO2 Height Weight   03/01/22 2253 03/01/22 2251 03/01/22 2251 03/01/22 2251 03/01/22 2251 03/01/22 2251 03/01/22 2251 03/01/22 2251   (!) 142/88 97.5 °F (36.4 °C) Oral 90 18 96 % 5' 2\" (1.575 m) 174 lb (78.9 kg)       Physical Exam  Vitals and nursing note reviewed. Constitutional:       Appearance: Normal appearance. She is not toxic-appearing or diaphoretic. HENT:      Head: Normocephalic and atraumatic. Nose: Nose normal.   Eyes:      General:         Right eye: No discharge. Left eye: No discharge. Cardiovascular:      Rate and Rhythm: Normal rate and regular rhythm. Heart sounds: Normal heart sounds. No murmur heard. Pulmonary:      Effort: Pulmonary effort is normal. No respiratory distress. Breath sounds: No wheezing or rhonchi. Abdominal:      General: Abdomen is flat. Bowel sounds are normal. There is no distension. Palpations: Abdomen is soft. Tenderness: There is abdominal tenderness in the right lower quadrant and left lower quadrant. There is no guarding or rebound. Negative signs include Jackson's sign and McBurney's sign. Musculoskeletal:         General: Normal range of motion. Cervical back: Normal range of motion and neck supple. Skin:     General: Skin is warm and dry. Capillary Refill: Capillary refill takes less than 2 seconds. Neurological:      General: No focal deficit present. Mental Status: She is alert and oriented to person, place, and time.    Psychiatric:         Mood and Affect: Mood normal.         Behavior: Behavior normal.         DIAGNOSTIC RESULTS   LABS:    Labs Reviewed   URINALYSIS WITH REFLEX TO CULTURE - Abnormal; Notable for the following components:       Result Value    Leukocyte Esterase, Urine TRACE (*)     All other components within normal limits    Narrative:     Performed at:  [facility],  [place], [facility]   Phone [phone]   COMPREHENSIVE METABOLIC PANEL - Abnormal; Notable for the following components:    Glucose 121 (*)     BUN 26 (*)     All other components within normal limits    Narrative:     Performed at:  [facility],  [place], [facility]   Phone [phone]   CBC WITH AUTO DIFFERENTIAL - Abnormal; Notable for the following components:    Lymphocytes Absolute 0.7 (*)     All other components within normal limits    Narrative:     Performed at:  [facility],  [place], [facility]   Phone [phone]   LIPASE    Narrative:     Performed at:  [facility],  [place], [facility]   Phone [phone]   MICROSCOPIC URINALYSIS    Narrative:     Performed at:  [facility],  [place], [facility]   Phone [phone]       When ordered, only abnormal lab results are displayed. All other labs were within normal range or not returned as of this dictation. EKG: When ordered, EKG's are interpreted by the Emergency Department Physician in the absence of a cardiologist.  Please see their note for interpretation of EKG.       RADIOLOGY:   Non-plain film images such as CT, Ultrasound and MRI are read by the radiologist. Plain radiographic images are visualized andpreliminarily interpreted by the  ED Provider with the below findings:        Interpretation perthe Radiologist below, if available at the time of this note:    CT ABDOMEN PELVIS W IV CONTRAST Additional Contrast? None Final Result   1. Peripelvic cysts are present in both renal sinuses and the size of the   cysts are increased from 2018. However there is no hydronephrosis or   hydroureter. 2.  The stomach is distended with fluid but without wall thickening. Gas and   fluid distended small bowel loops without a focal transition point. Could   relate to mild ileus. There is no focal wall thickening identified. 3.   Diverticulosis of the distal colon without signs of diverticulitis. Collapsed distal colon reduces the sensitivity for a mild colitis. 4.  Small focus of gas in the urinary bladder without bladder wall   thickening. Correlate for recent catheterization. No results found. PROCEDURES   Unless otherwise noted below, none     Procedures    CRITICAL CARE TIME   N/A    CONSULTS:  None      EMERGENCY DEPARTMENT COURSE and DIFFERENTIAL DIAGNOSIS/MDM:   Vitals:    Vitals:    03/01/22 2331 03/02/22 0036 03/02/22 0102 03/02/22 0157   BP: 130/77 122/74 123/75 122/70   Pulse: 88   84   Resp: 16   15   Temp:       TempSrc:       SpO2: 93%   95%   Weight:       Height:           Patient was given thefollowing medications:  Medications   ondansetron (ZOFRAN) injection 4 mg (4 mg IntraVENous Given 3/1/22 2333)   iopamidol (ISOVUE-370) 76 % injection 75 mL (75 mLs IntraVENous Given 3/2/22 0007)   ondansetron (ZOFRAN) injection 4 mg (4 mg IntraVENous Given 3/2/22 0155)           Patient has been able to tolerate p.o. nutrition. The patient has not been vomiting. At this time based on the patient's exam findings her abdominal exam is benign. She feels well. I believe she can be treated as an outpatient. Discussed with her the treatment plan for ileus and she will follow up with her family doctor. She is also been given strict instructions return back to the ER for any acute concerns. She is also advised of her renal cyst and the abnormalities with involving the CAT scan.   Patient states that she

## 2022-11-05 ENCOUNTER — HOSPITAL ENCOUNTER (EMERGENCY)
Age: 72
Discharge: HOME | End: 2022-11-05
Payer: MEDICARE

## 2022-11-05 ENCOUNTER — APPOINTMENT (OUTPATIENT)
Dept: CT IMAGING | Age: 72
End: 2022-11-05
Payer: MEDICARE

## 2022-11-05 VITALS
TEMPERATURE: 98.6 F | SYSTOLIC BLOOD PRESSURE: 161 MMHG | RESPIRATION RATE: 16 BRPM | HEIGHT: 62 IN | OXYGEN SATURATION: 98 % | DIASTOLIC BLOOD PRESSURE: 80 MMHG | HEART RATE: 87 BPM | BODY MASS INDEX: 31.83 KG/M2

## 2022-11-05 DIAGNOSIS — N20.1 URETEROLITHIASIS: Primary | ICD-10-CM

## 2022-11-05 LAB
A/G RATIO: 1.3 (ref 1.1–2.2)
ALBUMIN SERPL-MCNC: 4.4 G/DL (ref 3.4–5)
ALP BLD-CCNC: 85 U/L (ref 40–129)
ALT SERPL-CCNC: 20 U/L (ref 10–40)
ANION GAP SERPL CALCULATED.3IONS-SCNC: 12 MMOL/L (ref 3–16)
AST SERPL-CCNC: 19 U/L (ref 15–37)
BACTERIA: ABNORMAL /HPF
BASOPHILS ABSOLUTE: 0.1 K/UL (ref 0–0.2)
BASOPHILS RELATIVE PERCENT: 0.9 %
BILIRUB SERPL-MCNC: <0.2 MG/DL (ref 0–1)
BILIRUBIN URINE: NEGATIVE
BLOOD, URINE: ABNORMAL
BUN BLDV-MCNC: 21 MG/DL (ref 7–20)
CALCIUM SERPL-MCNC: 9.4 MG/DL (ref 8.3–10.6)
CHLORIDE BLD-SCNC: 103 MMOL/L (ref 99–110)
CLARITY: CLEAR
CO2: 23 MMOL/L (ref 21–32)
COLOR: ABNORMAL
CREAT SERPL-MCNC: 1.1 MG/DL (ref 0.6–1.2)
EOSINOPHILS ABSOLUTE: 0.3 K/UL (ref 0–0.6)
EOSINOPHILS RELATIVE PERCENT: 3.5 %
EPITHELIAL CELLS, UA: ABNORMAL /HPF (ref 0–5)
GFR SERPL CREATININE-BSD FRML MDRD: 53 ML/MIN/{1.73_M2}
GLUCOSE BLD-MCNC: 104 MG/DL (ref 70–99)
GLUCOSE URINE: NEGATIVE MG/DL
HCT VFR BLD CALC: 41.6 % (ref 36–48)
HEMOGLOBIN: 13.9 G/DL (ref 12–16)
KETONES, URINE: NEGATIVE MG/DL
LEUKOCYTE ESTERASE, URINE: NEGATIVE
LYMPHOCYTES ABSOLUTE: 1.3 K/UL (ref 1–5.1)
LYMPHOCYTES RELATIVE PERCENT: 16.8 %
MCH RBC QN AUTO: 29.3 PG (ref 26–34)
MCHC RBC AUTO-ENTMCNC: 33.4 G/DL (ref 31–36)
MCV RBC AUTO: 87.9 FL (ref 80–100)
MICROSCOPIC EXAMINATION: YES
MONOCYTES ABSOLUTE: 0.9 K/UL (ref 0–1.3)
MONOCYTES RELATIVE PERCENT: 11.4 %
MUCUS: ABNORMAL /LPF
NEUTROPHILS ABSOLUTE: 5.2 K/UL (ref 1.7–7.7)
NEUTROPHILS RELATIVE PERCENT: 67.4 %
NITRITE, URINE: NEGATIVE
PDW BLD-RTO: 14.6 % (ref 12.4–15.4)
PH UA: 6 (ref 5–8)
PLATELET # BLD: 294 K/UL (ref 135–450)
PMV BLD AUTO: 7.3 FL (ref 5–10.5)
POTASSIUM SERPL-SCNC: 4 MMOL/L (ref 3.5–5.1)
PROTEIN UA: NEGATIVE MG/DL
RBC # BLD: 4.73 M/UL (ref 4–5.2)
RBC UA: ABNORMAL /HPF (ref 0–4)
RENAL EPITHELIAL, UA: ABNORMAL /HPF (ref 0–1)
SODIUM BLD-SCNC: 138 MMOL/L (ref 136–145)
SPECIFIC GRAVITY UA: <=1.005 (ref 1–1.03)
SPECIMEN STATUS: NORMAL
TOTAL PROTEIN: 7.7 G/DL (ref 6.4–8.2)
URINE REFLEX TO CULTURE: ABNORMAL
URINE TYPE: ABNORMAL
UROBILINOGEN, URINE: 0.2 E.U./DL
WBC # BLD: 7.7 K/UL (ref 4–11)
WBC UA: ABNORMAL /HPF (ref 0–5)

## 2022-11-05 PROCEDURE — 96375 TX/PRO/DX INJ NEW DRUG ADDON: CPT

## 2022-11-05 PROCEDURE — 74176 CT ABD & PELVIS W/O CONTRAST: CPT

## 2022-11-05 PROCEDURE — 81001 URINALYSIS AUTO W/SCOPE: CPT

## 2022-11-05 PROCEDURE — 85025 COMPLETE CBC W/AUTO DIFF WBC: CPT

## 2022-11-05 PROCEDURE — 6360000002 HC RX W HCPCS: Performed by: NURSE PRACTITIONER

## 2022-11-05 PROCEDURE — 96374 THER/PROPH/DIAG INJ IV PUSH: CPT

## 2022-11-05 PROCEDURE — 80053 COMPREHEN METABOLIC PANEL: CPT

## 2022-11-05 PROCEDURE — 99284 EMERGENCY DEPT VISIT MOD MDM: CPT

## 2022-11-05 RX ORDER — TAMSULOSIN HYDROCHLORIDE 0.4 MG/1
0.4 CAPSULE ORAL DAILY
Qty: 14 CAPSULE | Refills: 0 | Status: SHIPPED | OUTPATIENT
Start: 2022-11-05 | End: 2022-11-05 | Stop reason: SDUPTHER

## 2022-11-05 RX ORDER — ONDANSETRON 4 MG/1
4 TABLET, ORALLY DISINTEGRATING ORAL EVERY 8 HOURS PRN
Qty: 20 TABLET | Refills: 0 | Status: SHIPPED | OUTPATIENT
Start: 2022-11-05 | End: 2022-11-05 | Stop reason: SDUPTHER

## 2022-11-05 RX ORDER — TAMSULOSIN HYDROCHLORIDE 0.4 MG/1
0.4 CAPSULE ORAL DAILY
Qty: 14 CAPSULE | Refills: 0 | Status: SHIPPED | OUTPATIENT
Start: 2022-11-05 | End: 2022-11-19

## 2022-11-05 RX ORDER — OXYCODONE HYDROCHLORIDE AND ACETAMINOPHEN 5; 325 MG/1; MG/1
1 TABLET ORAL EVERY 6 HOURS PRN
Qty: 8 TABLET | Refills: 0 | Status: SHIPPED | OUTPATIENT
Start: 2022-11-05 | End: 2022-11-08

## 2022-11-05 RX ORDER — KETOROLAC TROMETHAMINE 10 MG/1
10 TABLET, FILM COATED ORAL EVERY 6 HOURS PRN
Qty: 5 TABLET | Refills: 0 | Status: SHIPPED | OUTPATIENT
Start: 2022-11-05 | End: 2022-11-05 | Stop reason: SDUPTHER

## 2022-11-05 RX ORDER — KETOROLAC TROMETHAMINE 10 MG/1
10 TABLET, FILM COATED ORAL EVERY 6 HOURS PRN
Qty: 5 TABLET | Refills: 0 | Status: SHIPPED | OUTPATIENT
Start: 2022-11-05 | End: 2023-11-05

## 2022-11-05 RX ORDER — KETOROLAC TROMETHAMINE 30 MG/ML
30 INJECTION, SOLUTION INTRAMUSCULAR; INTRAVENOUS ONCE
Status: COMPLETED | OUTPATIENT
Start: 2022-11-05 | End: 2022-11-05

## 2022-11-05 RX ORDER — OXYCODONE HYDROCHLORIDE AND ACETAMINOPHEN 5; 325 MG/1; MG/1
1-2 TABLET ORAL EVERY 6 HOURS PRN
Qty: 8 TABLET | Refills: 0 | Status: SHIPPED | OUTPATIENT
Start: 2022-11-05 | End: 2022-11-05 | Stop reason: SDUPTHER

## 2022-11-05 RX ORDER — ONDANSETRON 4 MG/1
4 TABLET, ORALLY DISINTEGRATING ORAL EVERY 8 HOURS PRN
Qty: 20 TABLET | Refills: 0 | Status: SHIPPED | OUTPATIENT
Start: 2022-11-05

## 2022-11-05 RX ORDER — ONDANSETRON 2 MG/ML
4 INJECTION INTRAMUSCULAR; INTRAVENOUS ONCE
Status: COMPLETED | OUTPATIENT
Start: 2022-11-05 | End: 2022-11-05

## 2022-11-05 RX ADMIN — ONDANSETRON 4 MG: 2 INJECTION INTRAMUSCULAR; INTRAVENOUS at 19:45

## 2022-11-05 RX ADMIN — KETOROLAC TROMETHAMINE 30 MG: 30 INJECTION, SOLUTION INTRAMUSCULAR at 19:27

## 2022-11-05 ASSESSMENT — PAIN SCALES - GENERAL
PAINLEVEL_OUTOF10: 3
PAINLEVEL_OUTOF10: 2

## 2022-11-05 ASSESSMENT — PAIN - FUNCTIONAL ASSESSMENT: PAIN_FUNCTIONAL_ASSESSMENT: 0-10

## 2022-11-05 ASSESSMENT — PAIN DESCRIPTION - LOCATION: LOCATION: FLANK

## 2022-11-06 NOTE — ED NOTES
PT requests prescriptions printed out rather than called to pt preferred pharmacy. PT provided four prescriptions and given directions/phone number to 24 hour pharmacy.       Tonny Newsome RN  11/05/22 2032

## 2022-11-06 NOTE — ED PROVIDER NOTES
[address] [place] [address]  ED  EMERGENCY DEPARTMENT ENCOUNTER      This patient was not seen and evaluated by the attending physician. Pt Name: Andrew Razo  MRN: [id number]  [place] 1950  Date of evaluation: 11/5/2022  Provider: Bryce Ganser, APRN - CNP-C  PCP: Sj Alexandra PA-C      History provided by the patient. CHIEFCOMPLAINT:     Chief Complaint   Patient presents with    Hematuria    Back Pain     Patient reports waking up this AM with bright red blood in urine and lower R sided back pain, hx of kidney stones. Patient also reports nausea no emesis. HISTORY OF PRESENT ILLNESS:      Andrew Razo is a 71 y.o. female who presents to [address] [place] [address]  ED with complaints of right-sided back pain, history of kidney stone, hematuria. Patient states that she woke up this morning having blood in her urine, she states that she had kidney stones although they have been excruciating in the past.  She is here for further evaluation. LOCATION:right flank  QUALITY:ache  SEVERITY:3  DURATION:today  MODIFYING FACTORS:none noted    Nursing Notes were reviewed     REVIEW OF SYSTEMS:     Review of Systems  All systems, a total of 10, are reviewed and negative except for those that were just noted in history present illness.         PAST MEDICAL HISTORY:     Past Medical History:   Diagnosis Date    Basal cell carcinoma 2016    nasal     GERD (gastroesophageal reflux disease)     Glaucoma     History of bleeding ulcers 1970's    admitted    Hyperlipidemia     Hypertension     Hypothyroidism     Kidney stone     Osteoarthritis     left knee    Postmenopausal          SURGICAL HISTORY:      Past Surgical History:   Procedure Laterality Date    APPENDECTOMY      CHOLECYSTECTOMY  2008    COLONOSCOPY  10/24/2012    diverticulosis    CYSTOSCOPY Right 06/01/2017    right ureteroscopy, stone basket extraction, right ureteral stent    JOINT REPLACEMENT      KNEE ARTHROSCOPY      left SHOULDER ARTHROSCOPY      right    TONSILLECTOMY AND ADENOIDECTOMY      UPPER GASTROINTESTINAL ENDOSCOPY  10/24/2012    EGD- normal         CURRENT MEDICATIONS:       Discharge Medication List as of 11/5/2022  8:12 PM        CONTINUE these medications which have NOT CHANGED    Details   !! ondansetron (ZOFRAN ODT) 4 MG disintegrating tablet Take 1 tablet by mouth every 8 hours as needed for Nausea, Disp-6 tablet, R-0Print      potassium citrate (UROCIT-K) 5 MEQ (540 MG) extended release tablet Take 5 mEq by mouth 3 times daily (with meals)Historical Med      atorvastatin (LIPITOR) 40 MG tablet Take 40 mg by mouth dailyHistorical Med      dicyclomine (BENTYL) 10 MG capsule Take 1 capsule by mouth every 6 hours as needed (cramps), Disp-20 capsule, R-0Print      ondansetron (ZOFRAN) 4 MG tablet Take 1 tablet by mouth every 8 hours as needed for Nausea or Vomiting, Disp-12 tablet, R-0Print      losartan (COZAAR) 50 MG tablet Take 50 mg by mouth dailyHistorical Med      lansoprazole (PREVACID) 30 MG capsule Take 30 mg by mouth daily      SYNTHROID 100 MCG tablet TAKE 1 TABLET DAILY, Disp-90 tablet, R-2, INEZ      travoprost, benzalkonium, (TRAVATAN) 0.004 % ophthalmic solution Place 1 drop into both eyes nightly. !! - Potential duplicate medications found. Please discuss with provider. ALLERGIES:    Lisinopril and Vicodin [hydrocodone-acetaminophen]    FAMILY HISTORY:       Family History   Problem Relation Age of Onset    Heart Disease Father         CAD onset 61    Stroke Father         CVA    Cancer Father         Bladder    Heart Disease Brother         CAD/MI onset 52's    Stroke Mother     High Cholesterol Mother     High Blood Pressure Mother     Heart Disease Paternal Uncle           SOCIAL HISTORY:     Social History     Socioeconomic History    Marital status:    Tobacco Use    Smoking status: Never    Smokeless tobacco: Never   Substance and Sexual Activity    Alcohol use:  No Comment: rarely    Drug use: No       SCREENINGS:    [place] Coma Scale  Eye Opening: Spontaneous  Best Verbal Response: Oriented  Best Motor Response: Obeys commands  [place] Coma Scale Score: 15        PHYSICAL EXAM:       ED Triage Vitals [11/05/22 1538]   BP Temp Temp Source Heart Rate Resp SpO2 Height Weight   (!) 160/115 98.7 °F (37.1 °C) Oral 100 16 100 % 5' 2\" (1.575 m) --       Physical Exam    CONSTITUTIONAL: Awake and alert. Cooperative. Well-developed. Well-nourished. Vitals:    11/05/22 1538 11/05/22 1700 11/05/22 1949   BP: (!) 160/115 (!) 158/101 (!) 161/80   Pulse: 100 99 87   Resp: 16 16 16   Temp: 98.7 °F (37.1 °C) 98.6 °F (37 °C)    TempSrc: Oral Oral    SpO2: 100% 100% 98%   Height: 5' 2\" (1.575 m)       HENT: Normocephalic. Atraumatic. External ears normal, without discharge. TMs clear bilaterally. Nonasal discharge. Oropharynx clear, no erythema. Mucous membranes moist.  EYES: Conjunctiva non-injected, nolid abnormalities noted. No scleral icterus. PERRL. EOM's grossly intact. Anterior chambers clear. NECK: Supple. Normal ROM. No meningismus. No thyroid tenderness or swelling noted. CARDIOVASCULAR: RRR. No Murmer. No carotid bruits. PULMONARY/CHEST WALL: Effort normal. No tachypnea. Lungs clear to ausculation. ABDOMEN: Normal BS. Soft. Nondistended. No tenderness to palpation. No guarding. No hernias noted. No splenomegaly. Mild right cva tenderness. Back: Spine is midline. No ecchymosis. No crepituson palpation. No obvious subluxation of vertebral column. No saddle anesthesia or evidence of cauda equina. /ANORECTAL: Not assessed  MUSKULOSKELETAL: Normal ROM. No acute deformities. No edema. No tenderness to palpate. SKIN: Warm and dry. NEUROLOGICAL:  GCS 15. CN II-XII grossly intact. Strength is 5/5 in all extremities and sensation is intact. PSYCHIATRIC: Normal affect, normal insight and judgement. Alert and oriented x 3.         DIAGNOSTIC RESULTS:     LABS:    Results for orders placed or performed during the hospital encounter of 11/05/22   Urinalysis with Reflex to Culture    Specimen: Urine   Result Value Ref Range    Color, UA ASHLEY (A) Straw/Yellow    Clarity, UA Clear Clear    Glucose, Ur Negative Negative mg/dL    Bilirubin Urine Negative Negative    Ketones, Urine Negative Negative mg/dL    Specific Gravity, UA <=1.005 1.005 - 1.030    Blood, Urine LARGE (A) Negative    pH, UA 6.0 5.0 - 8.0    Protein, UA Negative Negative mg/dL    Urobilinogen, Urine 0.2 <2.0 E.U./dL    Nitrite, Urine Negative Negative    Leukocyte Esterase, Urine Negative Negative    Microscopic Examination YES     Urine Type NotGiven     Urine Reflex to Culture Not Indicated    Microscopic Urinalysis   Result Value Ref Range    Mucus, UA 1+ (A) None Seen /LPF    WBC, UA 0-2 0 - 5 /HPF    RBC, UA  (A) 0 - 4 /HPF    Epithelial Cells, UA 0-1 0 - 5 /HPF    Renal Epithelial, UA 0-1 0 - 1 /HPF    Bacteria, UA 2+ (A) None Seen /HPF   CBC with Auto Differential   Result Value Ref Range    WBC 7.7 4.0 - 11.0 K/uL    RBC 4.73 4.00 - 5.20 M/uL    Hemoglobin 13.9 12.0 - 16.0 g/dL    Hematocrit 41.6 36.0 - 48.0 %    MCV 87.9 80.0 - 100.0 fL    MCH 29.3 26.0 - 34.0 pg    MCHC 33.4 31.0 - 36.0 g/dL    RDW 14.6 12.4 - 15.4 %    Platelets 493 685 - 050 K/uL    MPV 7.3 5.0 - 10.5 fL    Neutrophils % 67.4 %    Lymphocytes % 16.8 %    Monocytes % 11.4 %    Eosinophils % 3.5 %    Basophils % 0.9 %    Neutrophils Absolute 5.2 1.7 - 7.7 K/uL    Lymphocytes Absolute 1.3 1.0 - 5.1 K/uL    Monocytes Absolute 0.9 0.0 - 1.3 K/uL    Eosinophils Absolute 0.3 0.0 - 0.6 K/uL    Basophils Absolute 0.1 0.0 - 0.2 K/uL   Comprehensive Metabolic Panel   Result Value Ref Range    Sodium 138 136 - 145 mmol/L    Potassium 4.0 3.5 - 5.1 mmol/L    Chloride 103 99 - 110 mmol/L    CO2 23 21 - 32 mmol/L    Anion Gap 12 3 - 16    Glucose 104 (H) 70 - 99 mg/dL    BUN 21 (H) 7 - 20 mg/dL    Creatinine 1.1 0.6 - 1.2 mg/dL    Est, Glom Filt Rate 53 (A) >60    Calcium 9.4 8.3 - 10.6 mg/dL    Total Protein 7.7 6.4 - 8.2 g/dL    Albumin 4.4 3.4 - 5.0 g/dL    Albumin/Globulin Ratio 1.3 1.1 - 2.2    Total Bilirubin <0.2 0.0 - 1.0 mg/dL    Alkaline Phosphatase 85 40 - 129 U/L    ALT 20 10 - 40 U/L    AST 19 15 - 37 U/L   Sample possible blood bank testing   Result Value Ref Range    Specimen Status MIRA          RADIOLOGY:  All x-ray studies are viewed/reviewed by me. Formal interpretations per the radiologist are as follows:      CT ABDOMEN PELVIS WO CONTRAST Additional Contrast? None   Final Result   1. 3 mm stone at the right ureteropelvic junction with mild associated   hydronephrosis. 2. Bilateral peripelvic renal cysts and a punctate stone at the lower pole of   the left kidney. 3. Colonic diverticulosis. EKG:  See EKG interpretation by an attending physician. PROCEDURES:   N/A    CRITICAL CARE TIME:   N/A    CONSULTS:  None      EMERGENCY DEPARTMENT COURSE andDIFFERENTIAL DIAGNOSIS/MDM:   Vitals:    Vitals:    11/05/22 1538 11/05/22 1700 11/05/22 1949   BP: (!) 160/115 (!) 158/101 (!) 161/80   Pulse: 100 99 87   Resp: 16 16 16   Temp: 98.7 °F (37.1 °C) 98.6 °F (37 °C)    TempSrc: Oral Oral    SpO2: 100% 100% 98%   Height: 5' 2\" (1.575 m)         Patient wasgiven the following medications:  Medications   ketorolac (TORADOL) injection 30 mg (30 mg IntraVENous Given 11/5/22 1927)   ondansetron (ZOFRAN) injection 4 mg (4 mg IntraVENous Given 11/5/22 1945)         Patient was evaluated independently by myself with the attending physician available for consultation. Patient presented to the emerged from today with complaints of hematuria, patient that history of kidney stones. I did do a CT which confirmed a 3 mm stone at the UPJ with some mild hydro. Patient had no concomitant infection with retained renal function.   She was given Toradol and Zofran here, discharged home with medication for symptomatic management, instructed to follow-up with urology group as an outpatient, she can return to the ED for emergent worsening symptoms. Patient was discharged home in good condition. She verbalized understanding of the plan of care and agreed with the. She was discharged in good condition. Patient laboratory studies, radiographic imaging, and assessment were all discussed with the patient and/orpatient family. There was shared decision-making between myself as well as the patient and/or their surrogate and we are all in agreement with discharge home. There was an opportunity for questions and all questions were answered tothe best of my ability and to the satisfaction of the patient and/or patient family. FINAL IMPRESSION:      1. Ureterolithiasis          DISPOSITION/PLAN:   DISPOSITION Decision To Discharge      PATIENT REFERRED TO:  [facility]  [id number] [facility] [address]  [address] [facility] [address]  [phone]  Call   For follow up      DISCHARGE MEDICATIONS:  Discharge Medication List as of 11/5/2022  8:12 PM        START taking these medications    Details   ketorolac (TORADOL) 10 MG tablet Take 1 tablet by mouth every 6 hours as needed for Pain, Disp-5 tablet, R-0Normal      oxyCODONE-acetaminophen (PERCOCET) 5-325 MG per tablet Take 1-2 tablets by mouth every 6 hours as needed for Pain for up to 3 days. WARNING:  May cause drowsiness. May impair ability to operate vehicles or machinery. Do not use in combination with alcohol., Disp-8 tablet, R-0Normal      !! ondansetron (ZOFRAN ODT) 4 MG disintegrating tablet Take 1 tablet by mouth every 8 hours as needed for Nausea, Disp-20 tablet, R-0Normal      tamsulosin (FLOMAX) 0.4 MG capsule Take 1 capsule by mouth daily for 14 doses, Disp-14 capsule, R-0Normal       !! - Potential duplicate medications found. Please discuss with provider.                      (Please note thatportions of this note were completed with a voice recognition program.  Efforts were made to edit the dictations, but occasionally words are mis-transcribed.)    RUFINO Valenzuela - CNP-C (electronicallysigned)        RUFINO Valenzuela - ROBERT  11/06/22 1128

## 2022-11-07 PROCEDURE — 96374 THER/PROPH/DIAG INJ IV PUSH: CPT

## 2022-11-07 PROCEDURE — 96375 TX/PRO/DX INJ NEW DRUG ADDON: CPT

## 2022-11-07 PROCEDURE — 99285 EMERGENCY DEPT VISIT HI MDM: CPT

## 2022-11-08 ENCOUNTER — ANESTHESIA (OUTPATIENT)
Dept: OPERATING ROOM | Age: 72
DRG: 661 | End: 2022-11-08
Payer: MEDICARE

## 2022-11-08 ENCOUNTER — APPOINTMENT (OUTPATIENT)
Dept: GENERAL RADIOLOGY | Age: 72
DRG: 661 | End: 2022-11-08
Payer: MEDICARE

## 2022-11-08 ENCOUNTER — APPOINTMENT (OUTPATIENT)
Dept: CT IMAGING | Age: 72
DRG: 661 | End: 2022-11-08
Payer: MEDICARE

## 2022-11-08 ENCOUNTER — ANESTHESIA EVENT (OUTPATIENT)
Dept: OPERATING ROOM | Age: 72
DRG: 661 | End: 2022-11-08
Payer: MEDICARE

## 2022-11-08 ENCOUNTER — HOSPITAL ENCOUNTER (INPATIENT)
Age: 72
LOS: 1 days | Discharge: HOME | DRG: 661 | End: 2022-11-08
Attending: EMERGENCY MEDICINE | Admitting: INTERNAL MEDICINE
Payer: MEDICARE

## 2022-11-08 VITALS
RESPIRATION RATE: 12 BRPM | TEMPERATURE: 97.8 F | HEART RATE: 86 BPM | WEIGHT: 182.2 LBS | OXYGEN SATURATION: 95 % | SYSTOLIC BLOOD PRESSURE: 129 MMHG | HEIGHT: 62 IN | DIASTOLIC BLOOD PRESSURE: 77 MMHG | BODY MASS INDEX: 33.53 KG/M2

## 2022-11-08 DIAGNOSIS — N20.1 URETERIC STONE: Primary | ICD-10-CM

## 2022-11-08 DIAGNOSIS — R10.9 FLANK PAIN: ICD-10-CM

## 2022-11-08 DIAGNOSIS — N13.2 HYDRONEPHROSIS WITH URINARY OBSTRUCTION DUE TO URETERAL CALCULUS: ICD-10-CM

## 2022-11-08 LAB
A/G RATIO: 1.3 (ref 1.1–2.2)
ALBUMIN SERPL-MCNC: 3.9 G/DL (ref 3.4–5)
ALP BLD-CCNC: 81 U/L (ref 40–129)
ALT SERPL-CCNC: 17 U/L (ref 10–40)
ANION GAP SERPL CALCULATED.3IONS-SCNC: 12 MMOL/L (ref 3–16)
AST SERPL-CCNC: 24 U/L (ref 15–37)
BASOPHILS ABSOLUTE: 0 K/UL (ref 0–0.2)
BASOPHILS RELATIVE PERCENT: 0.6 %
BILIRUB SERPL-MCNC: <0.2 MG/DL (ref 0–1)
BILIRUBIN URINE: ABNORMAL
BLOOD, URINE: ABNORMAL
BUN BLDV-MCNC: 23 MG/DL (ref 7–20)
CALCIUM SERPL-MCNC: 9 MG/DL (ref 8.3–10.6)
CHLORIDE BLD-SCNC: 104 MMOL/L (ref 99–110)
CLARITY: ABNORMAL
CO2: 23 MMOL/L (ref 21–32)
COLOR: ABNORMAL
COMMENT UA: ABNORMAL
CREAT SERPL-MCNC: 0.9 MG/DL (ref 0.6–1.2)
EKG ATRIAL RATE: 80 BPM
EKG DIAGNOSIS: NORMAL
EKG P AXIS: 57 DEGREES
EKG P-R INTERVAL: 182 MS
EKG Q-T INTERVAL: 420 MS
EKG QRS DURATION: 80 MS
EKG QTC CALCULATION (BAZETT): 484 MS
EKG R AXIS: -20 DEGREES
EKG T AXIS: 55 DEGREES
EKG VENTRICULAR RATE: 80 BPM
EOSINOPHILS ABSOLUTE: 0.3 K/UL (ref 0–0.6)
EOSINOPHILS RELATIVE PERCENT: 3.9 %
GFR SERPL CREATININE-BSD FRML MDRD: >60 ML/MIN/{1.73_M2}
GLUCOSE BLD-MCNC: 113 MG/DL (ref 70–99)
GLUCOSE BLD-MCNC: 96 MG/DL (ref 70–99)
GLUCOSE URINE: NEGATIVE MG/DL
HCT VFR BLD CALC: 39.4 % (ref 36–48)
HEMOGLOBIN: 13.1 G/DL (ref 12–16)
KETONES, URINE: ABNORMAL MG/DL
LEUKOCYTE ESTERASE, URINE: ABNORMAL
LYMPHOCYTES ABSOLUTE: 2.1 K/UL (ref 1–5.1)
LYMPHOCYTES RELATIVE PERCENT: 24 %
MCH RBC QN AUTO: 29.1 PG (ref 26–34)
MCHC RBC AUTO-ENTMCNC: 33.1 G/DL (ref 31–36)
MCV RBC AUTO: 87.8 FL (ref 80–100)
MICROSCOPIC EXAMINATION: YES
MONOCYTES ABSOLUTE: 1 K/UL (ref 0–1.3)
MONOCYTES RELATIVE PERCENT: 11.6 %
NEUTROPHILS ABSOLUTE: 5.1 K/UL (ref 1.7–7.7)
NEUTROPHILS RELATIVE PERCENT: 59.9 %
NITRITE, URINE: POSITIVE
PDW BLD-RTO: 14.8 % (ref 12.4–15.4)
PERFORMED ON: NORMAL
PH UA: 5 (ref 5–8)
PLATELET # BLD: 298 K/UL (ref 135–450)
PMV BLD AUTO: 8.3 FL (ref 5–10.5)
POTASSIUM REFLEX MAGNESIUM: 4.1 MMOL/L (ref 3.5–5.1)
PROTEIN UA: 100 MG/DL
RBC # BLD: 4.49 M/UL (ref 4–5.2)
RBC UA: >100 /HPF (ref 0–4)
SARS-COV-2, NAAT: NOT DETECTED
SODIUM BLD-SCNC: 139 MMOL/L (ref 136–145)
SPECIFIC GRAVITY UA: >=1.03 (ref 1–1.03)
TOTAL PROTEIN: 7 G/DL (ref 6.4–8.2)
URINE REFLEX TO CULTURE: ABNORMAL
URINE TYPE: ABNORMAL
UROBILINOGEN, URINE: 1 E.U./DL
WBC # BLD: 8.6 K/UL (ref 4–11)
WBC UA: ABNORMAL /HPF (ref 0–5)

## 2022-11-08 PROCEDURE — 1200000000 HC SEMI PRIVATE

## 2022-11-08 PROCEDURE — 74420 UROGRAPHY RTRGR +-KUB: CPT

## 2022-11-08 PROCEDURE — 2709999900 HC NON-CHARGEABLE SUPPLY: Performed by: UROLOGY

## 2022-11-08 PROCEDURE — BT1D1ZZ FLUOROSCOPY OF RIGHT KIDNEY, URETER AND BLADDER USING LOW OSMOLAR CONTRAST: ICD-10-PCS | Performed by: UROLOGY

## 2022-11-08 PROCEDURE — 6370000000 HC RX 637 (ALT 250 FOR IP): Performed by: INTERNAL MEDICINE

## 2022-11-08 PROCEDURE — 99223 1ST HOSP IP/OBS HIGH 75: CPT | Performed by: INTERNAL MEDICINE

## 2022-11-08 PROCEDURE — 3700000001 HC ADD 15 MINUTES (ANESTHESIA): Performed by: UROLOGY

## 2022-11-08 PROCEDURE — 2580000003 HC RX 258: Performed by: INTERNAL MEDICINE

## 2022-11-08 PROCEDURE — 2580000003 HC RX 258: Performed by: UROLOGY

## 2022-11-08 PROCEDURE — 6360000002 HC RX W HCPCS: Performed by: ANESTHESIOLOGY

## 2022-11-08 PROCEDURE — 7100000000 HC PACU RECOVERY - FIRST 15 MIN: Performed by: UROLOGY

## 2022-11-08 PROCEDURE — 93005 ELECTROCARDIOGRAM TRACING: CPT | Performed by: EMERGENCY MEDICINE

## 2022-11-08 PROCEDURE — 3700000000 HC ANESTHESIA ATTENDED CARE: Performed by: UROLOGY

## 2022-11-08 PROCEDURE — 74176 CT ABD & PELVIS W/O CONTRAST: CPT

## 2022-11-08 PROCEDURE — 0T768DZ DILATION OF RIGHT URETER WITH INTRALUMINAL DEVICE, VIA NATURAL OR ARTIFICIAL OPENING ENDOSCOPIC: ICD-10-PCS | Performed by: UROLOGY

## 2022-11-08 PROCEDURE — C1769 GUIDE WIRE: HCPCS | Performed by: UROLOGY

## 2022-11-08 PROCEDURE — 87635 SARS-COV-2 COVID-19 AMP PRB: CPT

## 2022-11-08 PROCEDURE — 36415 COLL VENOUS BLD VENIPUNCTURE: CPT

## 2022-11-08 PROCEDURE — 6360000002 HC RX W HCPCS: Performed by: INTERNAL MEDICINE

## 2022-11-08 PROCEDURE — 3600000004 HC SURGERY LEVEL 4 BASE: Performed by: UROLOGY

## 2022-11-08 PROCEDURE — 6360000002 HC RX W HCPCS

## 2022-11-08 PROCEDURE — 6370000000 HC RX 637 (ALT 250 FOR IP)

## 2022-11-08 PROCEDURE — 2500000003 HC RX 250 WO HCPCS: Performed by: NURSE ANESTHETIST, CERTIFIED REGISTERED

## 2022-11-08 PROCEDURE — 85025 COMPLETE CBC W/AUTO DIFF WBC: CPT

## 2022-11-08 PROCEDURE — 81001 URINALYSIS AUTO W/SCOPE: CPT

## 2022-11-08 PROCEDURE — 6360000002 HC RX W HCPCS: Performed by: NURSE ANESTHETIST, CERTIFIED REGISTERED

## 2022-11-08 PROCEDURE — 2720000010 HC SURG SUPPLY STERILE: Performed by: UROLOGY

## 2022-11-08 PROCEDURE — 80053 COMPREHEN METABOLIC PANEL: CPT

## 2022-11-08 PROCEDURE — 6360000002 HC RX W HCPCS: Performed by: EMERGENCY MEDICINE

## 2022-11-08 PROCEDURE — 87086 URINE CULTURE/COLONY COUNT: CPT

## 2022-11-08 PROCEDURE — 6360000004 HC RX CONTRAST MEDICATION: Performed by: UROLOGY

## 2022-11-08 PROCEDURE — 7100000001 HC PACU RECOVERY - ADDTL 15 MIN: Performed by: UROLOGY

## 2022-11-08 PROCEDURE — 2580000003 HC RX 258: Performed by: NURSE ANESTHETIST, CERTIFIED REGISTERED

## 2022-11-08 PROCEDURE — 3600000014 HC SURGERY LEVEL 4 ADDTL 15MIN: Performed by: UROLOGY

## 2022-11-08 PROCEDURE — C2617 STENT, NON-COR, TEM W/O DEL: HCPCS | Performed by: UROLOGY

## 2022-11-08 DEVICE — URETERAL STENT
Type: IMPLANTABLE DEVICE | Site: BLADDER | Status: FUNCTIONAL
Brand: CONTOUR™

## 2022-11-08 RX ORDER — LABETALOL HYDROCHLORIDE 5 MG/ML
5 INJECTION, SOLUTION INTRAVENOUS EVERY 10 MIN PRN
Status: DISCONTINUED | OUTPATIENT
Start: 2022-11-08 | End: 2022-11-08 | Stop reason: HOSPADM

## 2022-11-08 RX ORDER — ONDANSETRON 2 MG/ML
4 INJECTION INTRAMUSCULAR; INTRAVENOUS
Status: DISCONTINUED | OUTPATIENT
Start: 2022-11-08 | End: 2022-11-08 | Stop reason: HOSPADM

## 2022-11-08 RX ORDER — SODIUM CHLORIDE 9 MG/ML
INJECTION, SOLUTION INTRAVENOUS CONTINUOUS
Status: DISCONTINUED | OUTPATIENT
Start: 2022-11-08 | End: 2022-11-08 | Stop reason: HOSPADM

## 2022-11-08 RX ORDER — MIDAZOLAM HYDROCHLORIDE 1 MG/ML
1 INJECTION INTRAMUSCULAR; INTRAVENOUS EVERY 10 MIN PRN
Status: DISCONTINUED | OUTPATIENT
Start: 2022-11-08 | End: 2022-11-08 | Stop reason: HOSPADM

## 2022-11-08 RX ORDER — SODIUM CHLORIDE 9 MG/ML
INJECTION, SOLUTION INTRAVENOUS PRN
Status: DISCONTINUED | OUTPATIENT
Start: 2022-11-08 | End: 2022-11-08 | Stop reason: HOSPADM

## 2022-11-08 RX ORDER — SCOLOPAMINE TRANSDERMAL SYSTEM 1 MG/1
1 PATCH, EXTENDED RELEASE TRANSDERMAL ONCE
Status: DISCONTINUED | OUTPATIENT
Start: 2022-11-08 | End: 2022-11-08 | Stop reason: HOSPADM

## 2022-11-08 RX ORDER — LATANOPROST 50 UG/ML
1 SOLUTION/ DROPS OPHTHALMIC NIGHTLY
Status: DISCONTINUED | OUTPATIENT
Start: 2022-11-08 | End: 2022-11-08 | Stop reason: HOSPADM

## 2022-11-08 RX ORDER — MEPERIDINE HYDROCHLORIDE 25 MG/ML
12.5 INJECTION INTRAMUSCULAR; INTRAVENOUS; SUBCUTANEOUS EVERY 4 HOURS PRN
Status: CANCELLED | OUTPATIENT
Start: 2022-11-08

## 2022-11-08 RX ORDER — MORPHINE SULFATE 4 MG/ML
4 INJECTION, SOLUTION INTRAMUSCULAR; INTRAVENOUS
Status: DISCONTINUED | OUTPATIENT
Start: 2022-11-08 | End: 2022-11-08

## 2022-11-08 RX ORDER — SODIUM CHLORIDE 0.9 % (FLUSH) 0.9 %
5-40 SYRINGE (ML) INJECTION EVERY 12 HOURS SCHEDULED
Status: DISCONTINUED | OUTPATIENT
Start: 2022-11-08 | End: 2022-11-08 | Stop reason: HOSPADM

## 2022-11-08 RX ORDER — MIDAZOLAM HYDROCHLORIDE 1 MG/ML
INJECTION INTRAMUSCULAR; INTRAVENOUS PRN
Status: DISCONTINUED | OUTPATIENT
Start: 2022-11-08 | End: 2022-11-08 | Stop reason: SDUPTHER

## 2022-11-08 RX ORDER — ONDANSETRON 4 MG/1
4 TABLET, ORALLY DISINTEGRATING ORAL EVERY 8 HOURS PRN
Status: DISCONTINUED | OUTPATIENT
Start: 2022-11-08 | End: 2022-11-08 | Stop reason: HOSPADM

## 2022-11-08 RX ORDER — ATORVASTATIN CALCIUM 40 MG/1
40 TABLET, FILM COATED ORAL DAILY
Status: DISCONTINUED | OUTPATIENT
Start: 2022-11-08 | End: 2022-11-08 | Stop reason: HOSPADM

## 2022-11-08 RX ORDER — MIDAZOLAM HYDROCHLORIDE 1 MG/ML
INJECTION INTRAMUSCULAR; INTRAVENOUS
Status: COMPLETED
Start: 2022-11-08 | End: 2022-11-08

## 2022-11-08 RX ORDER — LIDOCAINE HYDROCHLORIDE 20 MG/ML
INJECTION, SOLUTION INFILTRATION; PERINEURAL PRN
Status: DISCONTINUED | OUTPATIENT
Start: 2022-11-08 | End: 2022-11-08 | Stop reason: SDUPTHER

## 2022-11-08 RX ORDER — ONDANSETRON 2 MG/ML
4 INJECTION INTRAMUSCULAR; INTRAVENOUS EVERY 6 HOURS PRN
Status: DISCONTINUED | OUTPATIENT
Start: 2022-11-08 | End: 2022-11-08

## 2022-11-08 RX ORDER — FENTANYL CITRATE 50 UG/ML
INJECTION, SOLUTION INTRAMUSCULAR; INTRAVENOUS PRN
Status: DISCONTINUED | OUTPATIENT
Start: 2022-11-08 | End: 2022-11-08 | Stop reason: SDUPTHER

## 2022-11-08 RX ORDER — LEVOTHYROXINE SODIUM 0.1 MG/1
100 TABLET ORAL DAILY
Status: DISCONTINUED | OUTPATIENT
Start: 2022-11-08 | End: 2022-11-08 | Stop reason: HOSPADM

## 2022-11-08 RX ORDER — PHENAZOPYRIDINE HYDROCHLORIDE 200 MG/1
200 TABLET, FILM COATED ORAL 3 TIMES DAILY PRN
Qty: 9 TABLET | Refills: 0 | Status: SHIPPED | OUTPATIENT
Start: 2022-11-08 | End: 2022-11-11

## 2022-11-08 RX ORDER — APREPITANT 40 MG/1
40 CAPSULE ORAL ONCE
Status: COMPLETED | OUTPATIENT
Start: 2022-11-08 | End: 2022-11-08

## 2022-11-08 RX ORDER — PHENYLEPHRINE HCL IN 0.9% NACL 1 MG/10 ML
SYRINGE (ML) INTRAVENOUS PRN
Status: DISCONTINUED | OUTPATIENT
Start: 2022-11-08 | End: 2022-11-08 | Stop reason: SDUPTHER

## 2022-11-08 RX ORDER — PANTOPRAZOLE SODIUM 40 MG/1
40 TABLET, DELAYED RELEASE ORAL
Status: DISCONTINUED | OUTPATIENT
Start: 2022-11-08 | End: 2022-11-08 | Stop reason: HOSPADM

## 2022-11-08 RX ORDER — MAGNESIUM HYDROXIDE 1200 MG/15ML
LIQUID ORAL PRN
Status: DISCONTINUED | OUTPATIENT
Start: 2022-11-08 | End: 2022-11-08 | Stop reason: ALTCHOICE

## 2022-11-08 RX ORDER — CIPROFLOXACIN 500 MG/1
500 TABLET, FILM COATED ORAL 2 TIMES DAILY
Qty: 10 TABLET | Refills: 0 | Status: SHIPPED | OUTPATIENT
Start: 2022-11-08 | End: 2022-11-13

## 2022-11-08 RX ORDER — OXYCODONE HYDROCHLORIDE 5 MG/1
10 TABLET ORAL PRN
Status: DISCONTINUED | OUTPATIENT
Start: 2022-11-08 | End: 2022-11-08 | Stop reason: HOSPADM

## 2022-11-08 RX ORDER — ACETAMINOPHEN 650 MG/1
650 SUPPOSITORY RECTAL EVERY 6 HOURS PRN
Status: DISCONTINUED | OUTPATIENT
Start: 2022-11-08 | End: 2022-11-08 | Stop reason: HOSPADM

## 2022-11-08 RX ORDER — LOSARTAN POTASSIUM 25 MG/1
50 TABLET ORAL DAILY
Status: DISCONTINUED | OUTPATIENT
Start: 2022-11-08 | End: 2022-11-08 | Stop reason: HOSPADM

## 2022-11-08 RX ORDER — SODIUM CHLORIDE 0.9 % (FLUSH) 0.9 %
5-40 SYRINGE (ML) INJECTION PRN
Status: DISCONTINUED | OUTPATIENT
Start: 2022-11-08 | End: 2022-11-08 | Stop reason: HOSPADM

## 2022-11-08 RX ORDER — OXYCODONE HYDROCHLORIDE 5 MG/1
5 TABLET ORAL PRN
Status: DISCONTINUED | OUTPATIENT
Start: 2022-11-08 | End: 2022-11-08 | Stop reason: HOSPADM

## 2022-11-08 RX ORDER — DICYCLOMINE HYDROCHLORIDE 10 MG/1
10 CAPSULE ORAL EVERY 6 HOURS PRN
Status: DISCONTINUED | OUTPATIENT
Start: 2022-11-08 | End: 2022-11-08 | Stop reason: HOSPADM

## 2022-11-08 RX ORDER — POLYETHYLENE GLYCOL 3350 17 G/17G
17 POWDER, FOR SOLUTION ORAL DAILY PRN
Status: DISCONTINUED | OUTPATIENT
Start: 2022-11-08 | End: 2022-11-08 | Stop reason: HOSPADM

## 2022-11-08 RX ORDER — TAMSULOSIN HYDROCHLORIDE 0.4 MG/1
0.4 CAPSULE ORAL DAILY
Status: DISCONTINUED | OUTPATIENT
Start: 2022-11-08 | End: 2022-11-08 | Stop reason: HOSPADM

## 2022-11-08 RX ORDER — PHENAZOPYRIDINE HYDROCHLORIDE 100 MG/1
200 TABLET, FILM COATED ORAL 3 TIMES DAILY PRN
Status: DISCONTINUED | OUTPATIENT
Start: 2022-11-08 | End: 2022-11-08 | Stop reason: HOSPADM

## 2022-11-08 RX ORDER — SODIUM CHLORIDE, SODIUM LACTATE, POTASSIUM CHLORIDE, CALCIUM CHLORIDE 600; 310; 30; 20 MG/100ML; MG/100ML; MG/100ML; MG/100ML
INJECTION, SOLUTION INTRAVENOUS CONTINUOUS PRN
Status: DISCONTINUED | OUTPATIENT
Start: 2022-11-08 | End: 2022-11-08 | Stop reason: SDUPTHER

## 2022-11-08 RX ORDER — KETOROLAC TROMETHAMINE 30 MG/ML
15 INJECTION, SOLUTION INTRAMUSCULAR; INTRAVENOUS EVERY 6 HOURS
Status: DISCONTINUED | OUTPATIENT
Start: 2022-11-08 | End: 2022-11-08

## 2022-11-08 RX ORDER — ACETAMINOPHEN 325 MG/1
650 TABLET ORAL EVERY 6 HOURS PRN
Status: DISCONTINUED | OUTPATIENT
Start: 2022-11-08 | End: 2022-11-08 | Stop reason: HOSPADM

## 2022-11-08 RX ORDER — PROPOFOL 10 MG/ML
INJECTION, EMULSION INTRAVENOUS PRN
Status: DISCONTINUED | OUTPATIENT
Start: 2022-11-08 | End: 2022-11-08 | Stop reason: SDUPTHER

## 2022-11-08 RX ORDER — KETOROLAC TROMETHAMINE 30 MG/ML
30 INJECTION, SOLUTION INTRAMUSCULAR; INTRAVENOUS ONCE
Status: COMPLETED | OUTPATIENT
Start: 2022-11-08 | End: 2022-11-08

## 2022-11-08 RX ORDER — SCOLOPAMINE TRANSDERMAL SYSTEM 1 MG/1
PATCH, EXTENDED RELEASE TRANSDERMAL
Status: DISCONTINUED
Start: 2022-11-08 | End: 2022-11-08 | Stop reason: HOSPADM

## 2022-11-08 RX ORDER — PANTOPRAZOLE SODIUM 40 MG/1
40 TABLET, DELAYED RELEASE ORAL DAILY
COMMUNITY
Start: 2022-09-16

## 2022-11-08 RX ORDER — ENOXAPARIN SODIUM 100 MG/ML
40 INJECTION SUBCUTANEOUS DAILY
Status: DISCONTINUED | OUTPATIENT
Start: 2022-11-08 | End: 2022-11-08 | Stop reason: HOSPADM

## 2022-11-08 RX ORDER — DIPHENHYDRAMINE HYDROCHLORIDE 50 MG/ML
12.5 INJECTION INTRAMUSCULAR; INTRAVENOUS
Status: DISCONTINUED | OUTPATIENT
Start: 2022-11-08 | End: 2022-11-08 | Stop reason: HOSPADM

## 2022-11-08 RX ORDER — ROCURONIUM BROMIDE 10 MG/ML
INJECTION, SOLUTION INTRAVENOUS PRN
Status: DISCONTINUED | OUTPATIENT
Start: 2022-11-08 | End: 2022-11-08 | Stop reason: SDUPTHER

## 2022-11-08 RX ORDER — DEXAMETHASONE SODIUM PHOSPHATE 4 MG/ML
INJECTION, SOLUTION INTRA-ARTICULAR; INTRALESIONAL; INTRAMUSCULAR; INTRAVENOUS; SOFT TISSUE PRN
Status: DISCONTINUED | OUTPATIENT
Start: 2022-11-08 | End: 2022-11-08 | Stop reason: SDUPTHER

## 2022-11-08 RX ORDER — ONDANSETRON 2 MG/ML
INJECTION INTRAMUSCULAR; INTRAVENOUS PRN
Status: DISCONTINUED | OUTPATIENT
Start: 2022-11-08 | End: 2022-11-08 | Stop reason: SDUPTHER

## 2022-11-08 RX ORDER — ONDANSETRON 2 MG/ML
4 INJECTION INTRAMUSCULAR; INTRAVENOUS EVERY 6 HOURS PRN
Status: DISCONTINUED | OUTPATIENT
Start: 2022-11-08 | End: 2022-11-08 | Stop reason: HOSPADM

## 2022-11-08 RX ADMIN — APREPITANT 40 MG: 40 CAPSULE ORAL at 11:55

## 2022-11-08 RX ADMIN — LOSARTAN POTASSIUM 50 MG: 25 TABLET, FILM COATED ORAL at 10:17

## 2022-11-08 RX ADMIN — DEXAMETHASONE SODIUM PHOSPHATE 8 MG: 4 INJECTION, SOLUTION INTRAMUSCULAR; INTRAVENOUS at 12:30

## 2022-11-08 RX ADMIN — LIDOCAINE HYDROCHLORIDE 60 MG: 20 INJECTION, SOLUTION INFILTRATION; PERINEURAL at 12:25

## 2022-11-08 RX ADMIN — HYDROMORPHONE HYDROCHLORIDE 0.5 MG: 1 INJECTION, SOLUTION INTRAMUSCULAR; INTRAVENOUS; SUBCUTANEOUS at 05:14

## 2022-11-08 RX ADMIN — MIDAZOLAM HYDROCHLORIDE 1 MG: 1 INJECTION INTRAMUSCULAR; INTRAVENOUS at 11:51

## 2022-11-08 RX ADMIN — TAMSULOSIN HYDROCHLORIDE 0.4 MG: 0.4 CAPSULE ORAL at 10:18

## 2022-11-08 RX ADMIN — MIDAZOLAM HYDROCHLORIDE 1 MG: 1 INJECTION, SOLUTION INTRAMUSCULAR; INTRAVENOUS at 11:51

## 2022-11-08 RX ADMIN — KETOROLAC TROMETHAMINE 30 MG: 30 INJECTION, SOLUTION INTRAMUSCULAR; INTRAVENOUS at 01:36

## 2022-11-08 RX ADMIN — SODIUM CHLORIDE: 9 INJECTION, SOLUTION INTRAVENOUS at 06:32

## 2022-11-08 RX ADMIN — ENOXAPARIN SODIUM 40 MG: 100 INJECTION SUBCUTANEOUS at 10:18

## 2022-11-08 RX ADMIN — ONDANSETRON HYDROCHLORIDE 4 MG: 2 INJECTION, SOLUTION INTRAMUSCULAR; INTRAVENOUS at 05:14

## 2022-11-08 RX ADMIN — Medication 50 MCG: at 13:07

## 2022-11-08 RX ADMIN — Medication 50 MCG: at 13:03

## 2022-11-08 RX ADMIN — PROPOFOL 150 MG: 10 INJECTION, EMULSION INTRAVENOUS at 12:25

## 2022-11-08 RX ADMIN — SUGAMMADEX 200 MG: 100 INJECTION, SOLUTION INTRAVENOUS at 13:09

## 2022-11-08 RX ADMIN — ATORVASTATIN CALCIUM 40 MG: 40 TABLET, FILM COATED ORAL at 10:18

## 2022-11-08 RX ADMIN — SODIUM CHLORIDE, POTASSIUM CHLORIDE, SODIUM LACTATE AND CALCIUM CHLORIDE: 600; 310; 30; 20 INJECTION, SOLUTION INTRAVENOUS at 12:20

## 2022-11-08 RX ADMIN — ROCURONIUM BROMIDE 40 MG: 10 INJECTION, SOLUTION INTRAVENOUS at 12:25

## 2022-11-08 RX ADMIN — CEFTRIAXONE SODIUM 1000 MG: 1 INJECTION, POWDER, FOR SOLUTION INTRAMUSCULAR; INTRAVENOUS at 06:33

## 2022-11-08 RX ADMIN — ONDANSETRON HYDROCHLORIDE 4 MG: 2 INJECTION, SOLUTION INTRAMUSCULAR; INTRAVENOUS at 12:30

## 2022-11-08 RX ADMIN — MIDAZOLAM 1 MG: 1 INJECTION INTRAMUSCULAR; INTRAVENOUS at 12:18

## 2022-11-08 RX ADMIN — FENTANYL CITRATE 25 MCG: 50 INJECTION INTRAMUSCULAR; INTRAVENOUS at 12:41

## 2022-11-08 ASSESSMENT — PAIN - FUNCTIONAL ASSESSMENT
PAIN_FUNCTIONAL_ASSESSMENT: 0-10
PAIN_FUNCTIONAL_ASSESSMENT: NONE - DENIES PAIN
PAIN_FUNCTIONAL_ASSESSMENT: 0-10

## 2022-11-08 ASSESSMENT — PAIN SCALES - GENERAL
PAINLEVEL_OUTOF10: 0
PAINLEVEL_OUTOF10: 10
PAINLEVEL_OUTOF10: 2
PAINLEVEL_OUTOF10: 0
PAINLEVEL_OUTOF10: 8
PAINLEVEL_OUTOF10: 0

## 2022-11-08 ASSESSMENT — ENCOUNTER SYMPTOMS
NAUSEA: 1
VOMITING: 0
ABDOMINAL PAIN: 0
SHORTNESS OF BREATH: 0
CHEST TIGHTNESS: 0
RHINORRHEA: 0
COUGH: 0
DIARRHEA: 0
EYE DISCHARGE: 0
BACK PAIN: 0
EYE REDNESS: 0

## 2022-11-08 ASSESSMENT — LIFESTYLE VARIABLES
HOW MANY STANDARD DRINKS CONTAINING ALCOHOL DO YOU HAVE ON A TYPICAL DAY: PATIENT DOES NOT DRINK
HOW OFTEN DO YOU HAVE A DRINK CONTAINING ALCOHOL: NEVER

## 2022-11-08 ASSESSMENT — PAIN DESCRIPTION - DESCRIPTORS
DESCRIPTORS: ACHING
DESCRIPTORS: ACHING

## 2022-11-08 ASSESSMENT — PAIN DESCRIPTION - LOCATION
LOCATION: GROIN;FLANK
LOCATION: FLANK
LOCATION: FLANK

## 2022-11-08 ASSESSMENT — PAIN DESCRIPTION - FREQUENCY: FREQUENCY: OTHER (COMMENT)

## 2022-11-08 ASSESSMENT — PAIN DESCRIPTION - ORIENTATION
ORIENTATION: LEFT
ORIENTATION: RIGHT
ORIENTATION: RIGHT

## 2022-11-08 ASSESSMENT — PAIN DESCRIPTION - PAIN TYPE: TYPE: SURGICAL PAIN

## 2022-11-08 NOTE — ED NOTES
0430- Perfect Serve sent to Dr. Ronny Francisco with urology at this time.   Donal José [phone] with callback       Walt FABIO Haji  11/08/22 9278

## 2022-11-08 NOTE — ANESTHESIA POSTPROCEDURE EVALUATION
Department of Anesthesiology  Postprocedure Note    Patient: Bharat Pereira  MRN: [id number]  YOB: 1950  Date of evaluation: 11/8/2022      Procedure Summary     Date: 11/08/22 Room / Location: [address] [place] [address] / [facility]    Anesthesia Start: 1219 Anesthesia Stop: 36    Procedure: CYSTOSCOPY RIGHT STENT PLACEMENT (Right: Bladder) Diagnosis:       Calculus of kidney      (Calculus of kidney [N20.0])    Surgeons: Zoe Alejandro MD Responsible Provider: Keri Rivera MD    Anesthesia Type: general ASA Status: 2          Anesthesia Type: No value filed.     Amanda Phase I: Amanda Score: 10    Amanda Phase II:        Anesthesia Post Evaluation    Comments: Postoperative Anesthesia Note    Name:    Bharat Pereira  MRN:      [id number]    Patient Vitals in the past 12 hrs:  11/08/22 1430, Pulse:86, SpO2:95 %  11/08/22 1415, BP:129/77, Pulse:93, SpO2:96 %  11/08/22 1400, BP:136/68, Pulse:85, SpO2:95 %  11/08/22 1345, Pulse:81, SpO2:91 %  11/08/22 1330, BP:129/75, Pulse:93, SpO2:92 %  11/08/22 1319, BP:121/70, Temp:97.8 °F (36.6 °C), Temp src:Temporal, Pulse:93, Resp:12, SpO2:93 %  11/08/22 1016, BP:130/73, Temp:98 °F (36.7 °C), Temp src:Oral, Pulse:68, Resp:16, SpO2:97 %  11/08/22 0928, BP:137/69, Temp:97.7 °F (36.5 °C), Temp src:Oral, Pulse:71, Resp:16, SpO2:97 %  11/08/22 0554, BP:127/78, Temp:97.6 °F (36.4 °C), Temp src:Oral, Pulse:70, Resp:16, SpO2:97 %, Height:5' 2\" (1.575 m), Weight:182 lb 3.2 oz (82.6 kg)  11/08/22 0458, BP:(!) 148/88, Temp:98.8 °F (37.1 °C), Pulse:83, Resp:18, SpO2:97 %     LABS:    CBC  Lab Results       Component                Value               Date/Time                  WBC                      8.6                 11/08/2022 12:15 AM        HGB                      13.1                11/08/2022 12:15 AM        HCT                      39.4                11/08/2022 12:15 AM        PLT                      298                 11/08/2022 12:15 AM   RENAL  Lab Results       Component                Value               Date/Time                  NA                       139                 11/08/2022 12:15 AM        K                        4.1                 11/08/2022 12:15 AM        CL                       104                 11/08/2022 12:15 AM        CO2                      23                  11/08/2022 12:15 AM        BUN                      23 (H)              11/08/2022 12:15 AM        CREATININE               0.9                 11/08/2022 12:15 AM        GLUCOSE                  113 (H)             11/08/2022 12:15 AM   COAGS  No results found for: PROTIME, INR, APTT    Intake & Output:  @60ALHN@    Nausea & Vomiting:  No    Level of Consciousness:  Awake    Pain Assessment:  Adequate analgesia    Anesthesia Complications:  No apparent anesthetic complications    SUMMARY      Vital signs stable  OK to discharge from Stage I post anesthesia care.   Care transferred from Anesthesiology department on discharge from perioperative area

## 2022-11-08 NOTE — ACP (ADVANCE CARE PLANNING)
Advance Care Planning     General Advance Care Planning (ACP) Conversation    Date of Conversation: 11/7/2022  Conducted with: Patient with Decision Making Capacity    Healthcare Decision Maker:    Primary Decision Maker: Alireza Sanchez - [place] - [phone]  Click here to complete Healthcare Decision Makers including selection of the Healthcare Decision Maker Relationship (ie \"Primary\"). Today we documented desired Decision Maker(s), who is (are) NOT Legal Next of Kin. ACP documents are required for decision maker authority. Pt aware without POA papers in place, her  is next of kin. She stated understanding but wants her daughter to be decision maker. She is aware Spiritual care will be consulted for POA papers.  Shikha Jarrett RN aware to consult spiritual care    Content/Action Overview:  Has NO ACP documents/care preferences - requested patient complete ACP documents  Reviewed DNR/DNI and patient elects Full Code (Attempt Resuscitation)  ventilation preferences  Pt stated she would want a vent if medically necessary     Length of Voluntary ACP Conversation in minutes:  <16 minutes (Non-Billable)    Daina Daniel MSW, [place]

## 2022-11-08 NOTE — PLAN OF CARE
Problem: Pain  Goal: Verbalizes/displays adequate comfort level or baseline comfort level  11/8/2022 1101 by Stiven Bunch RN  Outcome: Progressing  11/8/2022 0622 by Ana Contreras RN  Outcome: Progressing   Patient denies need for pain medication at this time.

## 2022-11-08 NOTE — PROGRESS NOTES
Consent signed and on chart for cystoscopy with right stent placement. Pt a/o. Am assessment completed see flow sheet. Pt denies any pain/ needs at this time. Call light within reach.

## 2022-11-08 NOTE — DISCHARGE SUMMARY
Name:  Deena Melchor  Room:  9956/8525-25  MRN:    [id number]    Discharge Summary      This discharge summary is in conjunction with a complete physical exam done on the day of discharge. Discharging Provider: Jakob Araiza PA-C  Discharging Attending Physician: Dr. Aggarwal Broad: 11/8/2022  Discharge:  11/8/22    HPI taken from admission H&P:    The patient is a 71 y.o. female with pmhx of kidney stones, HTN, HLD hypothyroidism, GERD who presented to [facility] ED with complaint of right sided flank pain that began about a weak ago, waxing and wane, was mild at the time. The pain kept getting worse. Sharp and stabbing. Then she developed nausea and dry heaves. She also endorses feeling feverish with body aches and chills and had multiple episodes of bloody urine. No dysuria. She has had multiple kidney stones requiring surgical intervention in the past.            Diagnoses this Admission and Hospital Course      #Right ureterolithiasis with hydronephrosis   #Hx of nephrolithiasis in the past   -UA +  -urine culture pending   -IVF   -NPO   -IV rocephin   -prn dilaudid   -urology consulted, planning for surgical intervention today --> s/p cystoscopy and right stent placement   -pyridium prn at home  -continue PO ciprofloxacin for 5 days   -follow up with urology outpatient for stent removal      #HTN   -on losartan      #HLD   -on statin      #Hypothyroidism   -on synthroid      #Bilateral peripelvic cysts   -noted in previous studies     Procedures (Please Review Full Report for Details)   cystoscopy with right stent placement     Consults    Urology       Physical Exam at Discharge:    /77   Pulse 86   Temp 97.8 °F (36.6 °C) (Temporal)   Resp 12   Ht 5' 2\" (1.575 m)   Wt 182 lb 3.2 oz (82.6 kg)   SpO2 95%   Breastfeeding No   BMI 33.32 kg/m²   Gen: No distress. Alert. Eyes: PERRL. No sclera icterus. No conjunctival injection. ENT: No discharge. Pharynx clear.    Neck: Trachea midline. Normal thyroid. Resp: No accessory muscle use. No crackles. No wheezes. No rhonchi. No dullness on percussion. CV: Regular rate. Regular rhythm. No murmur or rub. No edema. GI: Right flank tenderness. Non-distended. No masses. No organomegaly. Normal bowel sounds. No hernia. Lab Results   Component Value Date    WBC 8.6 11/08/2022    HGB 13.1 11/08/2022    HCT 39.4 11/08/2022    MCV 87.8 11/08/2022     11/08/2022     Lab Results   Component Value Date     11/08/2022    K 4.1 11/08/2022     11/08/2022    CO2 23 11/08/2022    BUN 23 (H) 11/08/2022    CREATININE 0.9 11/08/2022    GLUCOSE 113 (H) 11/08/2022    CALCIUM 9.0 11/08/2022    PROT 7.0 11/08/2022    LABALBU 3.9 11/08/2022    BILITOT <0.2 11/08/2022    ALKPHOS 81 11/08/2022    AST 24 11/08/2022    ALT 17 11/08/2022    LABGLOM >60 11/08/2022    GFRAA >60 03/01/2022    AGRATIO 1.3 11/08/2022    GLOB 3.2 07/02/2018             CULTURES  COVID not detected     RADIOLOGY  FL RETROGRADE PYELOGRAM RIGHT   Final Result   Intraprocedural fluoroscopic spot images as above. See separate procedure   report for more information. CT ABDOMEN PELVIS WO CONTRAST Additional Contrast? None   Final Result   There is a 3-4 mm calculus in the proximal right ureter with only minimal   progression from the position on 11/05/2022. Is causing proximal hydroureter   and mild right hydronephrosis. There is also bilateral peripelvic cysts which have been seen for multiple   prior studies. Small nonobstructing calculus in the left kidney without left   ureteral calculus. Urinary bladder is under distended without obvious wall thickening. And   correlate with urinalysis if concern for UTI.                Discharge Medications     Medication List        START taking these medications      ciprofloxacin 500 MG tablet  Commonly known as: CIPRO  Take 1 tablet by mouth 2 times daily for 5 days     phenazopyridine 200 MG tablet  Commonly known as: PYRIDIUM  Take 1 tablet by mouth 3 times daily as needed for Pain            CHANGE how you take these medications      ondansetron 4 MG disintegrating tablet  Commonly known as: Zofran ODT  Take 1 tablet by mouth every 8 hours as needed for Nausea  What changed: Another medication with the same name was removed. Continue taking this medication, and follow the directions you see here. CONTINUE taking these medications      atorvastatin 40 MG tablet  Commonly known as: LIPITOR     ketorolac 10 MG tablet  Commonly known as: TORADOL  Take 1 tablet by mouth every 6 hours as needed for Pain     losartan 50 MG tablet  Commonly known as: COZAAR     oxyCODONE-acetaminophen 5-325 MG per tablet  Commonly known as: Percocet  Take 1 tablet by mouth every 6 hours as needed for Pain for up to 3 days. WARNING:  May cause drowsiness. May impair ability to operate vehicles or machinery. Do not use in combination with alcohol. pantoprazole 40 MG tablet  Commonly known as: PROTONIX     potassium citrate 5 MEQ (540 MG) extended release tablet  Commonly known as: UROCIT-K     Synthroid 100 MCG tablet  Generic drug: levothyroxine  TAKE 1 TABLET DAILY     tamsulosin 0.4 MG capsule  Commonly known as: Flomax  Take 1 capsule by mouth daily for 14 doses     travoprost (benzalkonium) 0.004 % ophthalmic solution  Commonly known as: TRAVATAN            STOP taking these medications      dicyclomine 10 MG capsule  Commonly known as: Bentyl     ondansetron 4 MG tablet  Commonly known as: Zofran               Where to Get Your Medications        These medications were sent to [id number] [address], [address], [address]      Phone: [phone]   ciprofloxacin 500 MG tablet  phenazopyridine 200 MG tablet           Discharged in stable condition to home     Follow Up:   Follow up with PCP in 1 week and urology     Steve Oliva PA  11/08/22  3:50 PM

## 2022-11-08 NOTE — ED NOTES
0451- Perfect Serve to Dr. Maksim Oliveira for hospiatlist consult.   8572- Admission orders placed per Dom Lr, RN  11/08/22 [address] RN  11/08/22 7589

## 2022-11-08 NOTE — FLOWSHEET NOTE
11/08/22 0554   Vital Signs   Temp 97.6 °F (36.4 °C)   Temp Source Oral   Heart Rate 70   Heart Rate Source Monitor   Resp 16   /78   BP Location Left upper arm   MAP (Calculated) 94.33   Patient Position Semi fowlers   Level of Consciousness 0   MEWS Score 1   Oxygen Therapy   SpO2 97 %   O2 Device None (Room air)   Height and Weight   Height 5' 2\" (1.575 m)   Weight 182 lb 3.2 oz (82.6 kg)   BSA (Calculated - sq m) 1.9 sq meters   BMI (Calculated) 33.4   Admission questions complete at this time per pt report. Assessment complete- see flowsheets. Pt declines 4eyes exam and denies presence of any open areas or wounds to her skin, none visible at this time. Pt aware to call for any needs or changes, call light within reach. Pt aware to remain NPO until MD orders change and hat placed to collect and strain urine. Patient is able to demonstrate the ability to move from a reclining position to an upright position within the recliner. Will continue to monitor.   Antonio Kumar RN

## 2022-11-08 NOTE — ACP (ADVANCE CARE PLANNING)
delivered and discussed docs with pt. Pt was in process of being discharged after an outpatient procedure. Daughter was present and is a [employer] employee. We discussed making an appointment with Spiritual Care Outpatient services to complete at least the [address] [place] [address]. She also suggested she may try to complete when she returns for another procedure next week. Pt seemed genuinely interested in completing documents.

## 2022-11-08 NOTE — PROGRESS NOTES
Prescriptions sent to [facility] in [facility], and discharge instructions given. Pt verbalized understanding/ denies questions/ needs at this time. Transport called to transport pt to vehicle for discharge home.

## 2022-11-08 NOTE — CARE COORDINATION
n/a    DISCHARGE PLAN: Explained Case Management role/services. Chart review completed. Met with pt at bedside. Pt stated she is independent at home with her ADL's and plans on returning. Discussed skilled home care for RN/PT/OT and pt declined stating she won't need this. She stated that her daughter and son in law are nurses and lives across the street from her. She denied all needs from CM. Pt is having a Cysto/right sent placement per Dr. Bertrand Austin note today (see note). CM not following. Please notify CM if needs or concerns arise.      Angel Mcintyre MSW, CARLOS-S

## 2022-11-08 NOTE — CONSULTS
Patient:  Candie Motta  YOB: 1950   CSN:  [id number]    Referring Provider:  No ref. provider found   Primary Care Provider:  Whit Perdomo MD       Urology Attending Consult Note     Reason for Consultation:  nephrolithiasis, flank pain    Chief Complaint: \"I have another stone\"  HPI:  Eliza Florian is a 71 y.o. female who presented with 2 wk history of intermittent, severe renal colic. November 5 she came to the hospital CT showed a stone and then she represented yesterday repeat CT scan showed the stone and not migrated much. No fevers chills. CT showed a 4mm prox right ureter stone. The patient has had a kidney stone before at least 2x and had stone extraction by Adriel Quintana MD    Soc: Daughter , son in law,  nurses in the preop , pacu.       Past Medical History:   Diagnosis Date    Basal cell carcinoma 2016    nasal     GERD (gastroesophageal reflux disease)     Glaucoma     History of bleeding ulcers 1970's    admitted    Hyperlipidemia     Hypertension     Hypothyroidism     Kidney stone     Osteoarthritis     left knee    Postmenopausal        Past Surgical History:   Procedure Laterality Date    APPENDECTOMY      CHOLECYSTECTOMY  2008    COLONOSCOPY  10/24/2012    diverticulosis    CYSTOSCOPY Right 06/01/2017    right ureteroscopy, stone basket extraction, right ureteral stent    JOINT REPLACEMENT      KNEE ARTHROSCOPY      left    SHOULDER ARTHROSCOPY      right    TONSILLECTOMY AND ADENOIDECTOMY      UPPER GASTROINTESTINAL ENDOSCOPY  10/24/2012    EGD- normal       Medication List reviewed:     Current Facility-Administered Medications   Medication Dose Route Frequency Provider Last Rate Last Admin    HYDROmorphone (DILAUDID) injection 0.25 mg  0.25 mg IntraVENous Q3H PRN Tete Peng MD        Or    HYDROmorphone (DILAUDID) injection 0.5 mg  0.5 mg IntraVENous Q3H PRN Tete Peng MD   0.5 mg at 11/08/22 0514    atorvastatin (LIPITOR) tablet 40 mg  40 mg Oral Daily Faizan Bustos Leon Lao MD        dicyclomine (BENTYL) capsule 10 mg  10 mg Oral Q6H PRN King Gray MD        pantoprazole (PROTONIX) tablet 40 mg  40 mg Oral QAM AC King Gray MD        losartan (COZAAR) tablet 50 mg  50 mg Oral Daily King Gray MD        levothyroxine (SYNTHROID) tablet 100 mcg  100 mcg Oral Daily King Gray MD        tamsulosin (FLOMAX) capsule 0.4 mg  0.4 mg Oral Daily King Gray MD        latanoprost (XALATAN) 0.005 % ophthalmic solution 1 drop  1 drop Both Eyes Nightly King Gray MD        0.9 % sodium chloride infusion   IntraVENous Continuous King Gray  mL/hr at 11/08/22 9843 New Bag at 11/08/22 5460    sodium chloride flush 0.9 % injection 5-40 mL  5-40 mL IntraVENous 2 times per day King Gray MD        sodium chloride flush 0.9 % injection 5-40 mL  5-40 mL IntraVENous PRN King Gray MD        0.9 % sodium chloride infusion   IntraVENous PRN King Gray MD        enoxaparin (LOVENOX) injection 40 mg  40 mg SubCUTAneous Daily King Gray MD        ondansetron (ZOFRAN-ODT) disintegrating tablet 4 mg  4 mg Oral Q8H PRN King Gray MD        Or    ondansetron [facility]) injection 4 mg  4 mg IntraVENous Q6H PRN King Gray MD        polyethylene glycol (GLYCOLAX) packet 17 g  17 g Oral Daily PRN King Gray MD        acetaminophen (TYLENOL) tablet 650 mg  650 mg Oral Q6H PRN King Gray MD        Or    acetaminophen (TYLENOL) suppository 650 mg  650 mg Rectal Q6H PRN King Gray MD        cefTRIAXone (ROCEPHIN) 1,000 mg in dextrose 5 % 50 mL IVPB mini-bag  1,000 mg IntraVENous Q12H King Gray MD   Stopped at 11/08/22 8198       Allergies   Allergen Reactions    Lisinopril Other (See Comments)     cough    Vicodin [Hydrocodone-Acetaminophen] Itching       Family History   Problem Relation Age of Onset    Heart Disease Father         CAD onset 61    Stroke Father         CVA    Cancer Father Bladder    Heart Disease Brother         CAD/MI onset 52's    Stroke Mother     High Cholesterol Mother     High Blood Pressure Mother     Heart Disease Paternal Uncle        Social History     Tobacco Use    Smoking status: Never     Passive exposure: Never    Smokeless tobacco: Never   Vaping Use    Vaping Use: Never used   Substance Use Topics    Alcohol use: No     Comment: rarely    Drug use: No         Review of Systems: A 12 point ROS was performed and was unremarkable unless listed in the history of present illness. No intake/output data recorded.     Physical Exam:  Patient Vitals for the past 8 hrs:   BP Temp Temp src Pulse Resp SpO2 Height Weight   11/08/22 0554 127/78 97.6 °F (36.4 °C) Oral 70 16 97 % 5' 2\" (1.575 m) 182 lb 3.2 oz (82.6 kg)   11/08/22 0458 (!) 148/88 98.8 °F (37.1 °C) -- 83 18 97 % -- --   11/08/22 0202 131/78 -- -- 88 18 98 % -- --   11/08/22 0140 (!) 145/82 -- -- -- -- 98 % -- --   11/08/22 0138 (!) 145/82 -- -- 75 18 98 % -- --     Constitutional: pleasant patient in NAD, with a normal body habitus   EENT: pink conjunctivae, lips without cyanosis, normal appearance of ears and nose  Neck: no masses or lesions, trachea midline   Respiratory: normal respiratory movements without distress   Cardiovascular: regular rate and rhythm, lower extremities without edema   Abdomen: soft, NTND, no masses, no guarding  Back: mild right CVAT, no abnormal curvature of the spine  Skin: warm and dry, no rashes, lesions, or ulcers  Musculoskeletal: normal ROM, m. tone, no digital cyanosis, head normocephalic  Psych: normal mood and affect, alert and appropriately answers questions  : stable bladder, no urethral catheter    Labs:     No results found for: PSA  No results found for: PSA, PSADIA  Recent Labs     11/08/22  0015 11/05/22  1642   WBC 8.6 7.7   HGB 13.1 13.9   HCT 39.4 41.6   MCV 87.8 87.9    294     No results found for: LABA1C  Lab Results   Component Value Date    LABMICR YES 11/08/2022    LDLCALC 160 (H) 03/18/2016    CREATININE 0.9 11/08/2022     Lab Results   Component Value Date     11/08/2022    K 4.1 11/08/2022     11/08/2022    CO2 23 11/08/2022    BUN 23 (H) 11/08/2022    CREATININE 0.9 11/08/2022    GLUCOSE 113 (H) 11/08/2022    CALCIUM 9.0 11/08/2022    PROT 7.0 11/08/2022    LABALBU 3.9 11/08/2022    BILITOT <0.2 11/08/2022    ALKPHOS 81 11/08/2022    AST 24 11/08/2022    ALT 17 11/08/2022    LABGLOM >60 11/08/2022    GFRAA >60 03/01/2022    AGRATIO 1.3 11/08/2022    GLOB 3.2 07/02/2018     Lab Results   Component Value Date    CREATININE 0.9 11/08/2022    CREATININE 1.1 11/05/2022    CREATININE 0.7 03/01/2022       Lab Results   Component Value Date/Time    COLORU BROWN 11/08/2022 12:15 AM    NITRU POSITIVE 11/08/2022 12:15 AM    GLUCOSEU Negative 11/08/2022 12:15 AM    GLUCOSEU NEGATIVE 08/11/2010 10:32 AM    KETUA TRACE 11/08/2022 12:15 AM    UROBILINOGEN 1.0 11/08/2022 12:15 AM    BILIRUBINUR SMALL 11/08/2022 12:15 AM    BILIRUBINUR neg 11/07/2014 03:49 PM    BILIRUBINUR NEGATIVE 08/11/2010 10:32 AM         Radiology: \"Imaging was independently reviewed by myself and I agree with the radiology interpretation except as noted above\"  4 mm ureteral stone UPPER RIGHT 1/3 ureter with hydronephrosis. No left-sided stone seen. Parapelvic cyst both sides. Assessment:  Right hydronephrosis secondary to obstruction from ureteral calculi, as described above  Uncontrolled flank pain and nausea    Plan:   - TODAY or Wednesday pending OR time  --->Add on to OR for cysto/ right stent placement, possible uretero, laser lithotripsy.     - R/b/a surg reviewed and include infection/injury/bleeding, secondary procedures. - UA nit pos but bloody and ,    0wbc on micro.      Ucx  Pending  - broad spectrum IV abx, periop  - NPO  - IVF fluid resuscitation   - flomax 0.4mg qday - can help pass stone and help with stent related pain  - Prn toradol and narcotics for pain Lul Guerrero MD  Office: [phone]

## 2022-11-08 NOTE — PROGRESS NOTES
11/08/22 1527   Encounter Summary   Encounter Overview/Reason  Advance Care Planning   Service Provided For: Patient   Referral/Consult From: Physician   Support System Family members   Last Encounter  11/08/22  (Delivered ACP docs; pt being discharged)   Complexity of Encounter Moderate   Begin Time 1500   End Time  1530   Total Time Calculated 30 min   Advance Care Planning   Type ACP conversation   Assessment/Intervention/Outcome   Assessment Calm   Intervention Active listening; Other (Comment); Sustaining Presence/Ministry of presence  (ACP conversation)   Outcome Other (comment)  (Being discharged, will follow-up with outpatient services)       encountered pt as consult for ACP. Docs delivered and discussed. Pt being discharged within the hour. May contact Spiritual Care Outpatient Services to complete or possibly complete when she returns for procedure next week. Daughter present and [employer]. Pt genuinely seemed interested in completing HCOPA.

## 2022-11-08 NOTE — PROGRESS NOTES
Called Dr. Wendie Win office and requested per patient for a RX of peridium. I spoke with Sanna who will relay message and get back to me.

## 2022-11-08 NOTE — H&P
Hospital Medicine History & Physical      PCP: Jackie Calle MD    Date of Admission: 11/8/2022    Date of Service: Pt seen/examined on 11/8/2022     Chief Complaint:    Chief Complaint   Patient presents with    Dysuria    Flank Pain     Left side x4 days, history of kidney stones, has ernst with Dr. Arley Mckoy in AM         History Of Present Illness: The patient is a 71 y.o. female with pmhx of kidney stones, HTN, HLD hypothyroidism, GERD who presented to [address] [place] [address] ED with complaint of right sided flank pain that began about a weak ago, waxing and wane, was mild at the time. The pain kept getting worse. Sharp and stabbing. Then she developed nausea and dry heaves. She also endorses feeling feverish with body aches and chills and had multiple episodes of bloody urine. No dysuria. She has had multiple kidney stones requiring surgical intervention in the past.       Past Medical History:        Diagnosis Date    Basal cell carcinoma 2016    nasal     GERD (gastroesophageal reflux disease)     Glaucoma     History of bleeding ulcers 1970's    admitted    Hyperlipidemia     Hypertension     Hypothyroidism     Kidney stone     Osteoarthritis     left knee    Postmenopausal        Past Surgical History:        Procedure Laterality Date    APPENDECTOMY      CHOLECYSTECTOMY  2008    COLONOSCOPY  10/24/2012    diverticulosis    CYSTOSCOPY Right 06/01/2017    right ureteroscopy, stone basket extraction, right ureteral stent    JOINT REPLACEMENT      KNEE ARTHROSCOPY      left    SHOULDER ARTHROSCOPY      right    TONSILLECTOMY AND ADENOIDECTOMY      UPPER GASTROINTESTINAL ENDOSCOPY  10/24/2012    EGD- normal       Medications Prior to Admission:    Prior to Admission medications    Medication Sig Start Date End Date Taking?  Authorizing Provider   pantoprazole (PROTONIX) 40 MG tablet Take 40 mg by mouth daily 9/16/22  Yes Historical Provider, MD   oxyCODONE-acetaminophen (PERCOCET) 5-325 MG per tablet Take 1 tablet by mouth every 6 hours as needed for Pain for up to 3 days. WARNING:  May cause drowsiness. May impair ability to operate vehicles or machinery. Do not use in combination with alcohol. 11/5/22 11/8/22  RUFINO Rutledge CNP   ketorolac (TORADOL) 10 MG tablet Take 1 tablet by mouth every 6 hours as needed for Pain 11/5/22 11/5/23  RUFINO Rutledge CNP   ondansetron (ZOFRAN ODT) 4 MG disintegrating tablet Take 1 tablet by mouth every 8 hours as needed for Nausea 11/5/22   RUFINO Rutledge CNP   tamsulosin [facility]) 0.4 MG capsule Take 1 capsule by mouth daily for 14 doses 11/5/22 11/19/22  RUFINO Rutledge CNP   potassium citrate (UROCIT-K) 5 MEQ (540 MG) extended release tablet Take 5 mEq by mouth 3 times daily (with meals)    Historical Provider, MD   atorvastatin (LIPITOR) 40 MG tablet Take 40 mg by mouth daily    Historical Provider, MD   dicyclomine (BENTYL) 10 MG capsule Take 1 capsule by mouth every 6 hours as needed (cramps)  Patient not taking: Reported on 11/8/2022 7/2/18   RUFINO Munoz CNP   losartan (COZAAR) 50 MG tablet Take 50 mg by mouth daily    Historical Provider, MD   SYNTHROID 100 MCG tablet TAKE 1 TABLET DAILY 5/31/16   Ubaldo Patterson MD   travoprost, benzalkonium, (TRAVATAN) 0.004 % ophthalmic solution Place 1 drop into both eyes nightly. Historical Provider, MD       Allergies:  Lisinopril and Vicodin [hydrocodone-acetaminophen]    Social History:     TOBACCO:   reports that she has never smoked. She has never been exposed to tobacco smoke. She has never used smokeless tobacco.  ETOH:   reports no history of alcohol use.       Family History:   Positive as follows:        Problem Relation Age of Onset    Heart Disease Father         CAD onset 61    Stroke Father         CVA    Cancer Father         Bladder    Heart Disease Brother         CAD/MI onset 52's    Stroke Mother     High Cholesterol Mother     High Blood Pressure Mother     Heart Disease Paternal Uncle        REVIEW OF SYSTEMS:       Constitutional: Negative for fever, +chills, +body aches   HENT: Negative for sore throat   Eyes: Negative for redness   Respiratory: Negative  for dyspnea, cough   Cardiovascular: Negative for chest pain   Gastrointestinal: Negative for vomiting, diarrhea   Genitourinary: + for hematuria   Musculoskeletal: Negative for arthralgias   Skin: Negative for rash   Neurological: Negative for syncope   Hematological: Negative for adenopathy   Psychiatric/Behavorial: Negative for anxiety    PHYSICAL EXAM:    /78   Pulse 70   Temp 97.6 °F (36.4 °C) (Oral)   Resp 16   Ht 5' 2\" (1.575 m)   Wt 182 lb 3.2 oz (82.6 kg)   SpO2 97%   Breastfeeding No   BMI 33.32 kg/m²     Gen: No distress. Alert. Pleasant female   Eyes: PERRL. No sclera icterus. No conjunctival injection. Neck: No JVD. No Carotid Bruit. Trachea midline. Resp: No accessory muscle use. No crackles. No wheezes. No rhonchi. CV: Regular rate. Regular rhythm. No murmur. No rub. No edema. Peripheral Pulses: +2 palpable, equal bilaterally   GI: Non-tender. Non-distended. No masses. No organomegaly. Normal bowel sounds. No hernia. +Right CVA tenderness   Skin: Warm and dry. No nodule on exposed extremities. No rash on exposed extremities. M/S: No cyanosis. No joint deformity. No clubbing. Neuro: Awake. Grossly nonfocal    Psych: Oriented x 3. No anxiety or agitation.      Lab Results   Component Value Date    WBC 8.6 11/08/2022    HGB 13.1 11/08/2022    HCT 39.4 11/08/2022    MCV 87.8 11/08/2022     11/08/2022     Lab Results   Component Value Date     11/08/2022    K 4.1 11/08/2022     11/08/2022    CO2 23 11/08/2022    BUN 23 (H) 11/08/2022    CREATININE 0.9 11/08/2022    GLUCOSE 113 (H) 11/08/2022    CALCIUM 9.0 11/08/2022    PROT 7.0 11/08/2022    LABALBU 3.9 11/08/2022    BILITOT <0.2 11/08/2022    ALKPHOS 81 11/08/2022    AST 24 11/08/2022    ALT 17 11/08/2022    LABGLOM >60 11/08/2022 GFRAA >60 03/01/2022    AGRATIO 1.3 11/08/2022    GLOB 3.2 07/02/2018         U/A:    Lab Results   Component Value Date/Time    NITRITE neg 11/07/2014 03:49 PM    [place] 11/08/2022 12:15 AM    WBCUA 3-5 11/08/2022 12:15 AM    RBCUA >100 11/08/2022 12:15 AM    MUCUS 1+ 11/05/2022 03:41 PM    BACTERIA 2+ 11/05/2022 03:41 PM    CLARITYU CLOUDY 11/08/2022 12:15 AM    SPECGRAV >=1.030 11/08/2022 12:15 AM    LEUKOCYTESUR TRACE 11/08/2022 12:15 AM    BLOODU LARGE 11/08/2022 12:15 AM    GLUCOSEU Negative 11/08/2022 12:15 AM    GLUCOSEU NEGATIVE 08/11/2010 10:32 AM       CULTURES  COVID not detected     EKG:  pending     RADIOLOGY  CT ABDOMEN PELVIS WO CONTRAST Additional Contrast? None   Final Result   There is a 3-4 mm calculus in the proximal right ureter with only minimal   progression from the position on 11/05/2022. Is causing proximal hydroureter   and mild right hydronephrosis. There is also bilateral peripelvic cysts which have been seen for multiple   prior studies. Small nonobstructing calculus in the left kidney without left   ureteral calculus. Urinary bladder is under distended without obvious wall thickening. And   correlate with urinalysis if concern for UTI. Franklyn Orozco MD have reviewed the chart on [facility] and personally interviewed and examined patient, reviewed the data (labs and imaging) and after discussion with my PA formulated the plan. Agree with note with the following edits. HPI:     Patient is a 73-year-old white female with past medical history of kidney stones x2, hypertension, hyperlipidemia, GERD, hypothyroidism who came to the emergency room with right-sided flank pain that has been ongoing for the last 7 or 8 days. Pain comes and goes. Pain was getting worse and she came to ER. It was sharp and stabbing. She had nausea and dry heaves but no actual emesis. No fever or chills.   Work-up in the ER showed a right proximal ureteral stone.  She has had prior surgical intervention for kidney stones. This morning she continues to have some pain. CAT scan in the ER showed right proximal ureteral calculus with mild right hydronephrosis. I reviewed the patient's Past Medical History, Past Surgical History, Medications, and Allergies. Physical exam:    /69   Pulse 71   Temp 97.7 °F (36.5 °C) (Oral)   Resp 16   Ht 5' 2\" (1.575 m)   Wt 182 lb 3.2 oz (82.6 kg)   SpO2 97%   Breastfeeding No   BMI 33.32 kg/m²     Gen: No distress. Alert. Eyes: PERRL. No sclera icterus. No conjunctival injection. ENT: No discharge. Pharynx clear. Neck: Trachea midline. Normal thyroid. Resp: No accessory muscle use. No crackles. No wheezes. No rhonchi. No dullness on percussion. CV: Regular rate. Regular rhythm. No murmur or rub. No edema. GI: Right flank tenderness. Non-distended. No masses. No organomegaly. Normal bowel sounds. No hernia.           ASSESSMENT/PLAN:    #Right ureterolithiasis with hydronephrosis   #Hx of nephrolithiasis in the past   -UA +  -urine culture pending   -IVF   -NPO   -IV rocephin   -prn dilaudid   -urology consulted, planning for surgical intervention today     #HTN   -on losartan     #HLD   -on statin     #Hypothyroidism   -on synthroid     #Bilateral peripelvic cysts   -noted in previous studies     DVT Prophylaxis: Lovenox   Diet: Diet NPO Exceptions are: Ice Chips, Sips of Water with Meds  Code Status: Full Code    MILA Venegas  11/08/22  9:27 AM        Huy Ang MD 11/8/2022 9:50 AM

## 2022-11-08 NOTE — ANESTHESIA PRE PROCEDURE
Department of Anesthesiology  Preprocedure Note       Name:  Pau Contreras   Age:  71 y.o.  :  1950                                          MRN:  [id number]         Date:  2022      Surgeon: Elijah Barron):  Paloma Carlos MD    Procedure: Procedure(s):  CYSTOSCOPY RIGHT STENT PLACEMENT    Medications prior to admission:   Prior to Admission medications    Medication Sig Start Date End Date Taking? Authorizing Provider   pantoprazole (PROTONIX) 40 MG tablet Take 40 mg by mouth daily 22  Yes Historical Provider, MD   oxyCODONE-acetaminophen (PERCOCET) 5-325 MG per tablet Take 1 tablet by mouth every 6 hours as needed for Pain for up to 3 days. WARNING:  May cause drowsiness. May impair ability to operate vehicles or machinery. Do not use in combination with alcohol. 22  RUFINO Blair CNP   ketorolac (TORADOL) 10 MG tablet Take 1 tablet by mouth every 6 hours as needed for Pain 22  RUFINO Blair CNP   ondansetron (ZOFRAN ODT) 4 MG disintegrating tablet Take 1 tablet by mouth every 8 hours as needed for Nausea 22   RUFINO Blair CNP   tamsulosin [facility]) 0.4 MG capsule Take 1 capsule by mouth daily for 14 doses 22  RUFINO Blair CNP   potassium citrate (UROCIT-K) 5 MEQ (540 MG) extended release tablet Take 5 mEq by mouth 3 times daily (with meals)    Historical Provider, MD   atorvastatin (LIPITOR) 40 MG tablet Take 40 mg by mouth daily    Historical Provider, MD   dicyclomine (BENTYL) 10 MG capsule Take 1 capsule by mouth every 6 hours as needed (cramps)  Patient not taking: Reported on 2022   RUFINO Munoz CNP   losartan (COZAAR) 50 MG tablet Take 50 mg by mouth daily    Historical Provider, MD   SYNTHROID 100 MCG tablet TAKE 1 TABLET DAILY 16   Nabor Watts MD   travoprost, benzalkonium, (TRAVATAN) 0.004 % ophthalmic solution Place 1 drop into both eyes nightly.     Historical Provider, MD Current medications:    Current Facility-Administered Medications   Medication Dose Route Frequency Provider Last Rate Last Admin    atorvastatin (LIPITOR) tablet 40 mg  40 mg Oral Daily Zheng Hoang MD   40 mg at 11/08/22 1018    dicyclomine (BENTYL) capsule 10 mg  10 mg Oral Q6H PRN Zheng Hoang MD        pantoprazole (PROTONIX) tablet 40 mg  40 mg Oral QAM AC Zheng Hoang MD        losartan (COZAAR) tablet 50 mg  50 mg Oral Daily Zheng Hoang MD   50 mg at 11/08/22 1017    levothyroxine (SYNTHROID) tablet 100 mcg  100 mcg Oral Daily Zheng Hoang MD        tamsulosin [facility]) capsule 0.4 mg  0.4 mg Oral Daily Zheng Hoang MD   0.4 mg at 11/08/22 1018    latanoprost (XALATAN) 0.005 % ophthalmic solution 1 drop  1 drop Both Eyes Nightly Zheng Hoang MD        0.9 % sodium chloride infusion   IntraVENous Continuous Zheng Hoang  mL/hr at 11/08/22 0632 New Bag at 11/08/22 0461    sodium chloride flush 0.9 % injection 5-40 mL  5-40 mL IntraVENous 2 times per day Zheng Hoang MD        sodium chloride flush 0.9 % injection 5-40 mL  5-40 mL IntraVENous PRN Zheng Hoang MD        0.9 % sodium chloride infusion   IntraVENous PRN Zheng Hoang MD        enoxaparin (LOVENOX) injection 40 mg  40 mg SubCUTAneous Daily Zheng Hoang MD   40 mg at 11/08/22 1018    ondansetron (ZOFRAN-ODT) disintegrating tablet 4 mg  4 mg Oral Q8H PRN Zheng Hoang MD        Or    ondansetron [facility]) injection 4 mg  4 mg IntraVENous Q6H PRN Zheng Hoang MD        polyethylene glycol [facility]) packet 17 g  17 g Oral Daily PRN Zheng Hoang MD        acetaminophen (TYLENOL) tablet 650 mg  650 mg Oral Q6H PRN Zheng Hoang MD        Or    acetaminophen (TYLENOL) suppository 650 mg  650 mg Rectal Q6H PRN Zheng Hoang MD        cefTRIAXone (ROCEPHIN) 1,000 mg in dextrose 5 % 50 mL IVPB mini-bag  1,000 mg IntraVENous Q12H Zheng Hoang MD Stopped at 11/08/22 0717    HYDROmorphone (DILAUDID) injection 0.25 mg  0.25 mg IntraVENous Q3H PRN Pavel Fields MD        Or    HYDROmorphone (DILAUDID) injection 0.5 mg  0.5 mg IntraVENous Q3H PRN Pavel Fields MD           Allergies:     Allergies   Allergen Reactions    Lisinopril Other (See Comments)     cough    Vicodin [Hydrocodone-Acetaminophen] Itching       Problem List:    Patient Active Problem List   Diagnosis Code    Chest pain R07.9    GERD (gastroesophageal reflux disease) K21.9    Hypothyroid E03.9    HTN (hypertension) I10    Hyperlipidemia E78.5    Nephrolithiasis N20.0    Hydronephrosis with urinary obstruction due to ureteral calculus N13.2    Ureteric stone N20.1    Flank pain R10.9       Past Medical History:        Diagnosis Date    Basal cell carcinoma 2016    nasal     GERD (gastroesophageal reflux disease)     Glaucoma     History of bleeding ulcers 1970's    admitted    Hyperlipidemia     Hypertension     Hypothyroidism     Kidney stone     Osteoarthritis     left knee    Postmenopausal        Past Surgical History:        Procedure Laterality Date    APPENDECTOMY      CHOLECYSTECTOMY  2008    COLONOSCOPY  10/24/2012    diverticulosis    CYSTOSCOPY Right 06/01/2017    right ureteroscopy, stone basket extraction, right ureteral stent    JOINT REPLACEMENT      KNEE ARTHROSCOPY      left    SHOULDER ARTHROSCOPY      right    TONSILLECTOMY AND ADENOIDECTOMY      UPPER GASTROINTESTINAL ENDOSCOPY  10/24/2012    EGD- normal       Social History:    Social History     Tobacco Use    Smoking status: Never     Passive exposure: Never    Smokeless tobacco: Never   Substance Use Topics    Alcohol use: No     Comment: rarely                                Counseling given: Not Answered      Vital Signs (Current):   Vitals:    11/08/22 0458 11/08/22 0554 11/08/22 0928 11/08/22 1016   BP: (!) 148/88 127/78 137/69 130/73   Pulse: 83 70 71 68   Resp: 18 16 16 16   Temp: 98.8 °F (37.1 °C) 97.6 °F (36.4 °C) 97.7 °F (36.5 °C) 98 °F (36.7 °C)   TempSrc:  Oral Oral Oral   SpO2: 97% 97% 97% 97%   Weight:  182 lb 3.2 oz (82.6 kg)     Height:  5' 2\" (1.575 m)                                                BP Readings from Last 3 Encounters:   11/08/22 130/73   11/05/22 (!) 161/80   03/02/22 122/70       NPO Status: Time of last liquid consumption: 1930                        Time of last solid consumption: 1930                        Date of last liquid consumption: 11/07/22                        Date of last solid food consumption: 11/07/22    BMI:   Wt Readings from Last 3 Encounters:   11/08/22 182 lb 3.2 oz (82.6 kg)   03/01/22 174 lb (78.9 kg)   07/02/18 174 lb (78.9 kg)     Body mass index is 33.32 kg/m².     CBC:   Lab Results   Component Value Date/Time    WBC 8.6 11/08/2022 12:15 AM    RBC 4.49 11/08/2022 12:15 AM    HGB 13.1 11/08/2022 12:15 AM    HCT 39.4 11/08/2022 12:15 AM    MCV 87.8 11/08/2022 12:15 AM    RDW 14.8 11/08/2022 12:15 AM     11/08/2022 12:15 AM       CMP:   Lab Results   Component Value Date/Time     11/08/2022 12:15 AM    K 4.1 11/08/2022 12:15 AM     11/08/2022 12:15 AM    CO2 23 11/08/2022 12:15 AM    BUN 23 11/08/2022 12:15 AM    CREATININE 0.9 11/08/2022 12:15 AM    GFRAA >60 03/01/2022 11:25 PM    GFRAA >60 10/10/2012 10:49 AM    AGRATIO 1.3 11/08/2022 12:15 AM    LABGLOM >60 11/08/2022 12:15 AM    GLUCOSE 113 11/08/2022 12:15 AM    PROT 7.0 11/08/2022 12:15 AM    PROT 7.3 10/10/2012 10:49 AM    CALCIUM 9.0 11/08/2022 12:15 AM    BILITOT <0.2 11/08/2022 12:15 AM    ALKPHOS 81 11/08/2022 12:15 AM    AST 24 11/08/2022 12:15 AM    ALT 17 11/08/2022 12:15 AM       POC Tests:   Recent Labs     11/08/22  0558   POCGLU 96       Coags: No results found for: PROTIME, INR, APTT    HCG (If Applicable): No results found for: PREGTESTUR, PREGSERUM, HCG, HCGQUANT     ABGs: No results found for: PHART, PO2ART, SDG8AHN, SHX6GYD, BEART, D9IYUPPZ     Type & Screen (If Applicable):  No results found for: LABABO, LABRH    Drug/Infectious Status (If Applicable):  No results found for: HIV, HEPCAB    COVID-19 Screening (If Applicable):   Lab Results   Component Value Date/Time    COVID19 Not Detected 11/08/2022 04:55 AM           Anesthesia Evaluation  Patient summary reviewed and Nursing notes reviewed no history of anesthetic complications:   Airway: Mallampati: II     Neck ROM: full     Dental:          Pulmonary:Negative Pulmonary ROS and normal exam                               Cardiovascular:Negative CV ROS    (+) hypertension:, hyperlipidemia                  Neuro/Psych:   Negative Neuro/Psych ROS              GI/Hepatic/Renal: Neg GI/Hepatic/Renal ROS  (+) GERD:, renal disease: kidney stones,      (-) hiatal hernia       Endo/Other: Negative Endo/Other ROS   (+) hypothyroidism::., .                 Abdominal:             Vascular: Other Findings:           Anesthesia Plan      general     ASA 2     (I discussed with the patient the risks and benefits of PIV, general anesthesia, IV Narcotics, PACU. All questions were answered the patient agrees with the plan and wishes to proceed.  )  Induction: intravenous. Pre-Operative Diagnosis: Ureteric stone [N20.1]; Flank pain [R10.9]; Hydronephrosis with renal calculous obstruction [N13.2]; Hydronephrosis with urinary obstruction due to ureteral calculus [N13.2]    71 y.o.   BMI:  Body mass index is 33.32 kg/m².      Vitals:    11/08/22 0458 11/08/22 0554 11/08/22 0928 11/08/22 1016   BP: (!) 148/88 127/78 137/69 130/73   Pulse: 83 70 71 68   Resp: 18 16 16 16   Temp: 98.8 °F (37.1 °C) 97.6 °F (36.4 °C) 97.7 °F (36.5 °C) 98 °F (36.7 °C)   TempSrc:  Oral Oral Oral   SpO2: 97% 97% 97% 97%   Weight:  182 lb 3.2 oz (82.6 kg)     Height:  5' 2\" (1.575 m)         Allergies   Allergen Reactions    Lisinopril Other (See Comments)     cough    Vicodin [Hydrocodone-Acetaminophen] Itching       Social History     Tobacco Use    Smoking status: Never     Passive exposure: Never    Smokeless tobacco: Never   Substance Use Topics    Alcohol use: No     Comment: rarely       LABS:    CBC  Lab Results   Component Value Date/Time    WBC 8.6 11/08/2022 12:15 AM    HGB 13.1 11/08/2022 12:15 AM    HCT 39.4 11/08/2022 12:15 AM     11/08/2022 12:15 AM     RENAL  Lab Results   Component Value Date/Time     11/08/2022 12:15 AM    K 4.1 11/08/2022 12:15 AM     11/08/2022 12:15 AM    CO2 23 11/08/2022 12:15 AM    BUN 23 (H) 11/08/2022 12:15 AM    CREATININE 0.9 11/08/2022 12:15 AM    GLUCOSE 113 (H) 11/08/2022 12:15 AM     COAGS  No results found for: PROTIME, INR, APTT      Angel Flores MD   11/8/2022

## 2022-11-08 NOTE — ED PROVIDER NOTES
Emergency Department Provider Note  Location: [place] [address] ED  11/7/2022     Patient Identification  Angie Ferrer is a 71 y.o. female    Chief Complaint  Dysuria and Flank Pain (Left side x4 days, history of kidney stones, has ernst with Dr. Gilbert Torres in AM)      HPI    (History provided by patient and spouse/SO)    Patient is a 71 y.o. female with a significant PMHx of Multiple previous ureteral stones, sees Dr. Gilbert Torres, presenting the emergency department today with a known right proximal ureter 3 mm stone, with significant flank pain, some difficulty urinating, and some nausea. Patient says she took a Percocet prior to arrival, that was prescribed from the emergency department, and it did help, and her pain is much better here in the ER, however she feels like she cannot continue to have these pain episodes like this at home. Otherwise, denies any chest pain, vomiting, diarrhea. She is says that she needed previous ureteral stent before. No other concerns today in the ER. No fevers. Eating and drinking normally. Trying to stay very hydrated. CT stone 11/5:   1. 3 mm stone at the right ureteropelvic junction with mild associated   hydronephrosis. 2. Bilateral peripelvic renal cysts and a punctate stone at the lower pole of   the left kidney. 3. Colonic diverticulosis. I have reviewed the following nursing documentation:  Allergies: Allergies   Allergen Reactions    Lisinopril Other (See Comments)     cough    Vicodin [Hydrocodone-Acetaminophen] Itching       Past medical history:  has a past medical history of Basal cell carcinoma (2016), GERD (gastroesophageal reflux disease), Glaucoma, History of bleeding ulcers (1970's), Hyperlipidemia, Hypertension, Hypothyroidism, Kidney stone, Osteoarthritis, and Postmenopausal.    Past surgical history:  has a past surgical history that includes Appendectomy; Tonsillectomy and adenoidectomy; Cholecystectomy (2008);  Shoulder arthroscopy; Knee arthroscopy; Colonoscopy (10/24/2012); Upper gastrointestinal endoscopy (10/24/2012); joint replacement; and Cystoscopy (Right, 06/01/2017). Home medications:   Prior to Admission medications    Medication Sig Start Date End Date Taking? Authorizing Provider   oxyCODONE-acetaminophen (PERCOCET) 5-325 MG per tablet Take 1 tablet by mouth every 6 hours as needed for Pain for up to 3 days. WARNING:  May cause drowsiness. May impair ability to operate vehicles or machinery. Do not use in combination with alcohol.  11/5/22 11/8/22  RUFINO Christopher CNP   ketorolac (TORADOL) 10 MG tablet Take 1 tablet by mouth every 6 hours as needed for Pain 11/5/22 11/5/23  RUFINO Christopher CNP   ondansetron (ZOFRAN ODT) 4 MG disintegrating tablet Take 1 tablet by mouth every 8 hours as needed for Nausea 11/5/22   RUFINO Christopher CNP   tamsulosin [facility]) 0.4 MG capsule Take 1 capsule by mouth daily for 14 doses 11/5/22 11/19/22  RUFINO Christopher CNP   ondansetron (ZOFRAN ODT) 4 MG disintegrating tablet Take 1 tablet by mouth every 8 hours as needed for Nausea 3/2/22   RUFINO Guzman CNP   potassium citrate (UROCIT-K) 5 MEQ (540 MG) extended release tablet Take 5 mEq by mouth 3 times daily (with meals)    Historical Provider, MD   atorvastatin (LIPITOR) 40 MG tablet Take 40 mg by mouth daily    Historical Provider, MD   dicyclomine (BENTYL) 10 MG capsule Take 1 capsule by mouth every 6 hours as needed (cramps) 7/2/18   RUFINO Munoz CNP   ondansetron (ZOFRAN) 4 MG tablet Take 1 tablet by mouth every 8 hours as needed for Nausea or Vomiting 5/31/17   Marcos Umanzor MD   losartan (COZAAR) 50 MG tablet Take 50 mg by mouth daily    Historical Provider, MD   lansoprazole (PREVACID) 30 MG capsule Take 30 mg by mouth daily    Historical Provider, MD   SYNTHROID 100 MCG tablet TAKE 1 TABLET DAILY 5/31/16   Peyton Salmon MD   travoprost, benzalkonium, (TRAVATAN) 0.004 % ophthalmic solution Place 1 drop into both eyes nightly. Historical Provider, MD       Social history:  reports that she has never smoked. She has never used smokeless tobacco. She reports that she does not drink alcohol and does not use drugs. Family history:    Family History   Problem Relation Age of Onset    Heart Disease Father         CAD onset 61    Stroke Father         CVA    Cancer Father         Bladder    Heart Disease Brother         CAD/MI onset 52's    Stroke Mother     High Cholesterol Mother     High Blood Pressure Mother     Heart Disease Paternal Uncle          ROS  Review of Systems   Constitutional:  Negative for activity change, appetite change, fatigue and fever. HENT:  Negative for congestion and rhinorrhea. Eyes:  Negative for discharge and redness. Respiratory:  Negative for cough, chest tightness and shortness of breath. Cardiovascular:  Negative for chest pain and leg swelling. Gastrointestinal:  Positive for nausea. Negative for abdominal pain, diarrhea and vomiting. Genitourinary:  Positive for difficulty urinating and flank pain. Negative for dysuria and pelvic pain. Vaginal bleeding: .admit. Musculoskeletal:  Negative for back pain and neck pain. Skin:  Negative for rash and wound. Neurological:  Negative for dizziness, light-headedness and headaches. Exam  Vitals:    11/08/22 0008 11/08/22 0138 11/08/22 0140 11/08/22 0202   BP: (!) 158/87 (!) 145/82 (!) 145/82 131/78   Pulse: 83 75  88   Resp: 18 18  18   Temp: 97.5 °F (36.4 °C)      TempSrc: Oral      SpO2: 96% 98% 98% 98%   Weight: 174 lb (78.9 kg)      Height: 5' 2\" (1.575 m)            Physical Exam  Vitals reviewed. Constitutional:       General: She is not in acute distress. Appearance: Normal appearance. She is ill-appearing. Comments: Appears as if not feeling well, however interactive, conversational, pleasant, and in no acute clinical cardiopulmonary distress. HENT:      Head: Normocephalic and atraumatic. Right Ear: External ear normal.      Left Ear: External ear normal.      Nose: Nose normal. No congestion. Mouth/Throat:      Mouth: Mucous membranes are moist.      Pharynx: Oropharynx is clear. Eyes:      General:         Right eye: No discharge. Left eye: No discharge. Conjunctiva/sclera: Conjunctivae normal.   Cardiovascular:      Rate and Rhythm: Normal rate and regular rhythm. Pulmonary:      Effort: Pulmonary effort is normal. No respiratory distress. Breath sounds: Normal breath sounds. No wheezing. Abdominal:      General: Abdomen is flat. There is no distension. Palpations: Abdomen is soft. Tenderness: There is no abdominal tenderness. There is right CVA tenderness. Musculoskeletal:         General: No swelling or tenderness. Cervical back: Normal range of motion and neck supple. Right lower leg: No edema. Left lower leg: No edema. Skin:     General: Skin is warm and dry. Findings: No bruising or lesion. Neurological:      General: No focal deficit present. Mental Status: She is alert and oriented to person, place, and time.    Psychiatric:         Mood and Affect: Mood normal.         Behavior: Behavior normal.       ED Course    ED Medication Orders (From admission, onward)      Start Ordered     Status Ordering Provider    11/08/22 0445 11/08/22 0441  ketorolac (TORADOL) injection 15 mg  EVERY 6 HOURS         Acknowledged AYANNA WADE    11/08/22 0441 11/08/22 0441  ondansetron (ZOFRAN) injection 4 mg  EVERY 6 HOURS PRN         Acknowledged AYANNA WADE    11/08/22 0441 11/08/22 0441  morphine sulfate (PF) injection 4 mg  EVERY 3 HOURS PRN         Acknowledged AYANNA WADE    11/08/22 0045 11/08/22 0042  ketorolac (TORADOL) injection 30 mg  ONCE         Last MAR action: Given - by Danita CHARLES on 11/08/22 at [employer]            EKG  PREOP EKG:   EKG obtained today in the emergency department shows a normal sinus rhythm. Prolonged QT at 44. No ST segment ovation, ST segment depression, hyperacute T waves    Radiology  CT ABDOMEN PELVIS WO CONTRAST Additional Contrast? None    Result Date: 11/8/2022  EXAMINATION: CT OF THE ABDOMEN AND PELVIS WITHOUT CONTRAST 11/8/2022 1:01 am TECHNIQUE: CT of the abdomen and pelvis was performed without the administration of intravenous contrast. Multiplanar reformatted images are provided for review. Automated exposure control, iterative reconstruction, and/or weight based adjustment of the mA/kV was utilized to reduce the radiation dose to as low as reasonably achievable. COMPARISON: 11/05/2022 and 03/02/2022 HISTORY: ORDERING SYSTEM PROVIDED HISTORY: known right uretral stone, worsening significant pain, r/o hydro TECHNOLOGIST PROVIDED HISTORY: Reason for exam:->known right uretral stone, worsening significant pain, r/o hydro Additional Contrast?->None Decision Support Exception - unselect if not a suspected or confirmed emergency medical condition->Emergency Medical Condition (MA) Reason for Exam: known stone pain increased FINDINGS: Lower Chest: Atelectatic and mild COPD changes in the lower lungs. There is no pleural effusion. Organs: Lack of IV contrast reduces evaluation the organs and vasculature. There is no apparent mass or nodularity at the unenhanced liver. Previous cholecystectomy has been performed. The spleen is not enlarged. There is no pancreatic calcification, ductal dilatation, or surrounding fluid collection. The adrenal glands are unremarkable. Peripelvic cysts at the left kidney with mild perinephric stranding/edema. However the left collecting system and left ureter are not dilated. There is a punctate calculus in the lower pole of the left kidney. Peripelvic cysts at the right kidney are redemonstrated as well. There is some distension of the true renal calices and proximal most ureter.   A 3-4 mm calculus is present in the proximal right ureter only minimally progressed from 11/05/2022. The distal right ureter is not dilated. GI/Bowel: The stomach, small bowel, and colon are not dilated. No secondary sign of appendicitis or colonic diverticulitis. There is colonic diverticulosis. There is no ascites or pneumoperitoneum. Pelvis: The urinary bladder is under distended without obvious wall thickening. The uterus is not enlarged. There is no free fluid in the pelvis. Peritoneum/Retroperitoneum: No abdominal aortic aneurysm, retroperitoneal hematoma, or bulky lymphadenopathy. Bones/Soft Tissues: Degenerative changes at the spine and to a lesser extent pelvis. No acute fracture or destruction is identified. No focal fat stranding or collection in the body wall. There is a 3-4 mm calculus in the proximal right ureter with only minimal progression from the position on 11/05/2022. Is causing proximal hydroureter and mild right hydronephrosis. There is also bilateral peripelvic cysts which have been seen for multiple prior studies. Small nonobstructing calculus in the left kidney without left ureteral calculus. Urinary bladder is under distended without obvious wall thickening. And correlate with urinalysis if concern for UTI.       Labs  Results for orders placed or performed during the hospital encounter of 11/08/22   CMP w/ Reflex to MG   Result Value Ref Range    Sodium 139 136 - 145 mmol/L    Potassium reflex Magnesium 4.1 3.5 - 5.1 mmol/L    Chloride 104 99 - 110 mmol/L    CO2 23 21 - 32 mmol/L    Anion Gap 12 3 - 16    Glucose 113 (H) 70 - 99 mg/dL    BUN 23 (H) 7 - 20 mg/dL    Creatinine 0.9 0.6 - 1.2 mg/dL    Est, Glom Filt Rate >60 >60    Calcium 9.0 8.3 - 10.6 mg/dL    Total Protein 7.0 6.4 - 8.2 g/dL    Albumin 3.9 3.4 - 5.0 g/dL    Albumin/Globulin Ratio 1.3 1.1 - 2.2    Total Bilirubin <0.2 0.0 - 1.0 mg/dL    Alkaline Phosphatase 81 40 - 129 U/L    ALT 17 10 - 40 U/L    AST 24 15 - 37 U/L   CBC with Auto Differential   Result Value Ref Range    WBC 8.6 4.0 - 11.0 K/uL    RBC 4.49 4.00 - 5.20 M/uL    Hemoglobin 13.1 12.0 - 16.0 g/dL    Hematocrit 39.4 36.0 - 48.0 %    MCV 87.8 80.0 - 100.0 fL    MCH 29.1 26.0 - 34.0 pg    MCHC 33.1 31.0 - 36.0 g/dL    RDW 14.8 12.4 - 15.4 %    Platelets 237 188 - 891 K/uL    MPV 8.3 5.0 - 10.5 fL    Neutrophils % 59.9 %    Lymphocytes % 24.0 %    Monocytes % 11.6 %    Eosinophils % 3.9 %    Basophils % 0.6 %    Neutrophils Absolute 5.1 1.7 - 7.7 K/uL    Lymphocytes Absolute 2.1 1.0 - 5.1 K/uL    Monocytes Absolute 1.0 0.0 - 1.3 K/uL    Eosinophils Absolute 0.3 0.0 - 0.6 K/uL    Basophils Absolute 0.0 0.0 - 0.2 K/uL   Urinalysis with Reflex to Culture    Specimen: Urine   Result Value Ref Range    Urine Type NotGiven        Procedures  Procedures        MDM  Patient seen and evaluated. Relevant records reviewed. - Patient is a 71 y.o. female with concerns of a known right ureteral stone, coming to back to the emergency department with continued pain. Took a Percocet prior to arrival, and right now she is comfortable on initial evaluation. [facility] evaluation today reveals reassuring renal function, no leukocytosis, And otherwise electrolytes are reassuring. CT shows a 3 to 4 mm calculus in the proximal right ureter with only minimal progression from 11/5, with proximal hydroureter and mild right hydronephrosis. Discussed findings with urology, and they would like patient admitted for the surgical intervention today or tomorrow. They would like to keep her n.p.o. until they see her this morning. I have placed as needed pain medication orders. Discussed with patient at length. Ordered preop EKG. She has been hemodynamically stable today in the emergency department. Of note, during patient's evaluation, laboratory is down; do not have a urine result at this time. Is going to be significantly delayed.   However, has been urinating normally, not dysuric, and has no leukocytosis--however urine pending at time of admission. As this patient requires further evaluation and management as above, this patient will be admitted to the hospital for subsequent care. I spoke with Dr. Rox Foley who accepts patient for admission. They are available to place admission orders. Medications   ketorolac (TORADOL) injection 15 mg (has no administration in time range)   morphine sulfate (PF) injection 4 mg (has no administration in time range)   ondansetron (ZOFRAN) injection 4 mg (has no administration in time range)   ketorolac (TORADOL) injection 30 mg (30 mg IntraVENous Given 11/8/22 0136)     - I discussed the results, including any incidental findings, with patient. Questions answered. Patient/family agreeable to plan and express understanding of plan. Disposition:  Admit to med/surg floor in fair condition. Is this patient to be included in the SEP-1 Core Measure due to severe sepsis or septic shock? No   Exclusion criteria - the patient is NOT to be included for SEP-1 Core Measure due to:  2+ SIRS criteria are not met    Consult this encounter: urology, hospitalist     Clinical Impression:  1. Ureteric stone    2. Flank pain    3. Hydronephrosis with urinary obstruction due to ureteral calculus        Blood pressure 131/78, pulse 88, temperature 97.5 °F (36.4 °C), temperature source Oral, resp. rate 18, height 5' 2\" (1.575 m), weight 174 lb (78.9 kg), SpO2 98 %, not currently breastfeeding. Josephine Ugalde DO, am the primary attending of record and contributed the majority of evaluation and treatment of emergent care for this encounter. This chart was generated in part by using Dragon Dictation system and may contain errors related to that system including errors in grammar, punctuation, and spelling, as well as words and phrases that may be inappropriate. If there are any questions or concerns please feel free to contact the dictating provider for clarification.      2000 Sheryl Hoffman DO  [facility]   11/08/22 1576

## 2022-11-09 LAB — URINE CULTURE, ROUTINE: NORMAL

## 2022-11-11 NOTE — OP NOTE
Operative Note      Patient: Ish Duarte  YOB: 1950  MRN: [id number]    Date of Procedure: 11/8/2022    Pre-Operative Diagnosis: Right nephrolithiasis (4mm upj ), uncontrolled flank pain    Post-Operative Diagnosis: same      Procedure Performed:    1. Cystoscopy  2. retrograde pyelogram with interpretation, fluoro <1hr  3. Right rigid ureteroscopy and flexible ureteroscopy stone manipulation  4. Ureteral stent placement     Surgeon:  Jenny Manuel MD    Assistant: scrub   Anesthesia: General      Findings:    Retrograde pyelogram showed mild hydronephrosis and stent in place. Bladder nl;   Urethral nl. Specimens: none  Intravenous Fluids: per anesthesia   Estimated Blood Loss: 5 mL   Complications: None      Indications: see the consult    Procedure:  After obtaining informed consent, the patient was brought to the operating room and placed supine on the operating room table. After adequate anesthesia, he was repositioned in the dorsal lithotomy position and prepped & draped in the standard surgical fashion. I began the case by doing rigid cystoscopy with a 21-[place] sheath and a 30-degree lens. The urethra was within normal limits. Once in the bladder, I saw no evidence of tumors, stones, or diverticula. A sensor wire was then inserted into ureter and advanced up the ureter under fluoroscopic guidance. The wire curled appropriately within the upper pole of right kidney. I then inserted the semirigid ureteroscope into the bladder and was able to intubate into right ureteral orifice over the previous wire to train track into ureter, In the stone was seen up high but I was over a wire so cannot easily manipulate the stone. I then backed out and went back in next to wire. I could advance all way up and could see the stone but Angulation was too severe and  the basket could not snare  the stone near the UPJ. The rigid scope was backed off. I then performed flexible ureteroscopy. The flexible ureteroscope was advanced over the wire But again it was rather tight in the upper ureter and I cannot get high enough to get to the stone and it appeared the stone was manipulated up in the renal pelvis. Apull-back ureteroscopy was performed and this did not identify any large lower stones. Contrast was injected and no extrav seen and contrast filled upper pole of kidney. 10cc total for interpretation. This showed some proximal hydroureter and hydronephrosis. Richa Stains stent was backed loaded through cystoscope and advanced over wire and confirmed in kidney with fluoro and bladder with direct vision. String removed. The bladder was then emptied with a straight catheter and the patient was then awakened from anesthesia, extubated, and brought to the PACU in stable condition. There were no apparent complications.       Follow up :  -in 1-2wk  -flex uretero,  laser litho, cysto, stent change        Tami Matt MD  Urology  Office: [phone]

## 2022-12-30 ENCOUNTER — APPOINTMENT (OUTPATIENT)
Dept: GENERAL RADIOLOGY | Age: 72
End: 2022-12-30
Payer: MEDICARE

## 2022-12-30 ENCOUNTER — APPOINTMENT (OUTPATIENT)
Dept: CT IMAGING | Age: 72
End: 2022-12-30
Payer: MEDICARE

## 2022-12-30 ENCOUNTER — HOSPITAL ENCOUNTER (EMERGENCY)
Age: 72
Discharge: HOME | End: 2022-12-31
Attending: STUDENT IN AN ORGANIZED HEALTH CARE EDUCATION/TRAINING PROGRAM
Payer: MEDICARE

## 2022-12-30 DIAGNOSIS — R07.9 CHEST PAIN, UNSPECIFIED TYPE: Primary | ICD-10-CM

## 2022-12-30 LAB
A/G RATIO: 1.4 (ref 1.1–2.2)
ALBUMIN SERPL-MCNC: 4 G/DL (ref 3.4–5)
ALP BLD-CCNC: 80 U/L (ref 40–129)
ALT SERPL-CCNC: 16 U/L (ref 10–40)
ANION GAP SERPL CALCULATED.3IONS-SCNC: 12 MMOL/L (ref 3–16)
AST SERPL-CCNC: 20 U/L (ref 15–37)
BASOPHILS ABSOLUTE: 0.1 K/UL (ref 0–0.2)
BASOPHILS RELATIVE PERCENT: 0.8 %
BILIRUB SERPL-MCNC: <0.2 MG/DL (ref 0–1)
BILIRUBIN URINE: NEGATIVE
BLOOD, URINE: NEGATIVE
BUN BLDV-MCNC: 20 MG/DL (ref 7–20)
CALCIUM SERPL-MCNC: 9 MG/DL (ref 8.3–10.6)
CHLORIDE BLD-SCNC: 104 MMOL/L (ref 99–110)
CLARITY: CLEAR
CO2: 25 MMOL/L (ref 21–32)
COLOR: YELLOW
CREAT SERPL-MCNC: 1 MG/DL (ref 0.6–1.2)
D DIMER: 0.64 UG/ML FEU (ref 0–0.6)
EOSINOPHILS ABSOLUTE: 0.3 K/UL (ref 0–0.6)
EOSINOPHILS RELATIVE PERCENT: 2.7 %
GFR SERPL CREATININE-BSD FRML MDRD: 60 ML/MIN/{1.73_M2}
GLUCOSE BLD-MCNC: 124 MG/DL (ref 70–99)
GLUCOSE URINE: NEGATIVE MG/DL
HCT VFR BLD CALC: 38.9 % (ref 36–48)
HEMOGLOBIN: 12.9 G/DL (ref 12–16)
INFLUENZA A: NOT DETECTED
INFLUENZA B: NOT DETECTED
KETONES, URINE: NEGATIVE MG/DL
LEUKOCYTE ESTERASE, URINE: ABNORMAL
LYMPHOCYTES ABSOLUTE: 1.8 K/UL (ref 1–5.1)
LYMPHOCYTES RELATIVE PERCENT: 18.8 %
MAGNESIUM: 1.8 MG/DL (ref 1.8–2.4)
MCH RBC QN AUTO: 29.5 PG (ref 26–34)
MCHC RBC AUTO-ENTMCNC: 33.2 G/DL (ref 31–36)
MCV RBC AUTO: 88.8 FL (ref 80–100)
MICROSCOPIC EXAMINATION: YES
MONOCYTES ABSOLUTE: 1 K/UL (ref 0–1.3)
MONOCYTES RELATIVE PERCENT: 10.1 %
NEUTROPHILS ABSOLUTE: 6.5 K/UL (ref 1.7–7.7)
NEUTROPHILS RELATIVE PERCENT: 67.6 %
NITRITE, URINE: NEGATIVE
PDW BLD-RTO: 14.2 % (ref 12.4–15.4)
PH UA: 7 (ref 5–8)
PLATELET # BLD: 283 K/UL (ref 135–450)
PMV BLD AUTO: 8.1 FL (ref 5–10.5)
POTASSIUM REFLEX MAGNESIUM: 4.2 MMOL/L (ref 3.5–5.1)
PRO-BNP: 52 PG/ML (ref 0–124)
PROTEIN UA: NEGATIVE MG/DL
RBC # BLD: 4.38 M/UL (ref 4–5.2)
RBC UA: NORMAL /HPF (ref 0–4)
SARS-COV-2 RNA, RT PCR: NOT DETECTED
SODIUM BLD-SCNC: 141 MMOL/L (ref 136–145)
SPECIFIC GRAVITY UA: <=1.005 (ref 1–1.03)
TOTAL PROTEIN: 6.9 G/DL (ref 6.4–8.2)
TROPONIN: <0.01 NG/ML
TSH REFLEX: 1.21 UIU/ML (ref 0.27–4.2)
URINE REFLEX TO CULTURE: ABNORMAL
URINE TYPE: ABNORMAL
UROBILINOGEN, URINE: 0.2 E.U./DL
WBC # BLD: 9.6 K/UL (ref 4–11)
WBC UA: NORMAL /HPF (ref 0–5)

## 2022-12-30 PROCEDURE — 6370000000 HC RX 637 (ALT 250 FOR IP): Performed by: STUDENT IN AN ORGANIZED HEALTH CARE EDUCATION/TRAINING PROGRAM

## 2022-12-30 PROCEDURE — 83735 ASSAY OF MAGNESIUM: CPT

## 2022-12-30 PROCEDURE — 81001 URINALYSIS AUTO W/SCOPE: CPT

## 2022-12-30 PROCEDURE — 36415 COLL VENOUS BLD VENIPUNCTURE: CPT

## 2022-12-30 PROCEDURE — 85025 COMPLETE CBC W/AUTO DIFF WBC: CPT

## 2022-12-30 PROCEDURE — 71045 X-RAY EXAM CHEST 1 VIEW: CPT

## 2022-12-30 PROCEDURE — 99285 EMERGENCY DEPT VISIT HI MDM: CPT

## 2022-12-30 PROCEDURE — 71260 CT THORAX DX C+: CPT | Performed by: STUDENT IN AN ORGANIZED HEALTH CARE EDUCATION/TRAINING PROGRAM

## 2022-12-30 PROCEDURE — 85379 FIBRIN DEGRADATION QUANT: CPT

## 2022-12-30 PROCEDURE — 6360000004 HC RX CONTRAST MEDICATION: Performed by: STUDENT IN AN ORGANIZED HEALTH CARE EDUCATION/TRAINING PROGRAM

## 2022-12-30 PROCEDURE — 80053 COMPREHEN METABOLIC PANEL: CPT

## 2022-12-30 PROCEDURE — 84443 ASSAY THYROID STIM HORMONE: CPT

## 2022-12-30 PROCEDURE — 83880 ASSAY OF NATRIURETIC PEPTIDE: CPT

## 2022-12-30 PROCEDURE — 93005 ELECTROCARDIOGRAM TRACING: CPT

## 2022-12-30 PROCEDURE — 87636 SARSCOV2 & INF A&B AMP PRB: CPT

## 2022-12-30 PROCEDURE — 84484 ASSAY OF TROPONIN QUANT: CPT

## 2022-12-30 RX ORDER — NITROGLYCERIN 0.4 MG/1
0.4 TABLET SUBLINGUAL ONCE
Status: COMPLETED | OUTPATIENT
Start: 2022-12-30 | End: 2022-12-30

## 2022-12-30 RX ADMIN — NITROGLYCERIN 0.4 MG: 0.4 TABLET SUBLINGUAL at 22:26

## 2022-12-30 RX ADMIN — IOPAMIDOL 85 ML: 755 INJECTION, SOLUTION INTRAVENOUS at 23:58

## 2022-12-30 ASSESSMENT — PAIN DESCRIPTION - ORIENTATION: ORIENTATION: LEFT

## 2022-12-30 ASSESSMENT — PAIN DESCRIPTION - DESCRIPTORS: DESCRIPTORS: PRESSURE

## 2022-12-30 ASSESSMENT — PAIN - FUNCTIONAL ASSESSMENT: PAIN_FUNCTIONAL_ASSESSMENT: 0-10

## 2022-12-30 ASSESSMENT — PAIN DESCRIPTION - LOCATION: LOCATION: CHEST

## 2022-12-30 ASSESSMENT — PAIN SCALES - GENERAL: PAINLEVEL_OUTOF10: 3

## 2022-12-31 VITALS
TEMPERATURE: 98.3 F | DIASTOLIC BLOOD PRESSURE: 68 MMHG | RESPIRATION RATE: 23 BRPM | SYSTOLIC BLOOD PRESSURE: 126 MMHG | WEIGHT: 175 LBS | HEART RATE: 84 BPM | HEIGHT: 62 IN | BODY MASS INDEX: 32.2 KG/M2 | OXYGEN SATURATION: 96 %

## 2022-12-31 LAB — TROPONIN: <0.01 NG/ML

## 2022-12-31 PROCEDURE — 84484 ASSAY OF TROPONIN QUANT: CPT

## 2022-12-31 PROCEDURE — 36415 COLL VENOUS BLD VENIPUNCTURE: CPT

## 2022-12-31 NOTE — DISCHARGE INSTRUCTIONS
Follow-up with cardiology for already scheduled appointment on Tuesday as well as your primary physician. Return to emergency department for any further episodes of chest pain, shortness of breath lightheadedness or dizziness or any new change or worsening symptoms were always here for reevaluation never hesitate to return.

## 2022-12-31 NOTE — ED PROVIDER NOTES
Emergency Department Encounter    Patient: Judith Gomez  MRN: [id number]  : 1950  Date of Evaluation: 2022  ED Provider:  Lou Landry MD    Triage Chief Complaint:   Chest Pain (Pt. Arrives via EMS for chest pain. Pt. Took 324 ASA PTA. Pt. With established cardiologist, states shes supposed to get a monitor Tuesday because she has had irregularities in her heart rate.)    [place]:  Judith Gomez is a 71 y.o. female with history seen below presenting with complaints of left-sided chest pain. Patient states that over the past 1 to 2 days she has had left-sided chest discomfort. Patient states she does have some discomfort of her chest when she palpates over the left side of her chest wall that she believes recreates the pain but wants to make sure it is not her heart. Patient states the pain is constant with intermittent episodes of worsening. She has a unsure what causes that her episodes of worsening and is unsure if it is positional.  States the pain is constant 2 out of 10 with intermittent episodes of worsening pain to 5 out of 10. States that sharp/pressure. Denies shortness of breath. Denies cough, sputum production. Denies lower extremity edema increased from baseline, orthopnea. Denies headache, blurred vision, focal neurodeficits, motor or sensory changes. Denies fevers or chills. Denies recent falls or trauma. Denies neck or back pain. Denies abdominal pain, nausea vomiting, diarrhea constipation or urinary symptoms.     ROS - see HPI, below listed is current ROS at time of my eval:  At least 14 systems reviewed, negative other HPI    Past Medical History:   Diagnosis Date    Basal cell carcinoma 2016    nasal     GERD (gastroesophageal reflux disease)     Glaucoma     History of bleeding ulcers 's    admitted    Hyperlipidemia     Hypertension     Hypothyroidism     Kidney stone     Osteoarthritis     left knee    Postmenopausal      Past Surgical History:   Procedure Laterality Date    APPENDECTOMY      CHOLECYSTECTOMY  2008    COLONOSCOPY  10/24/2012    diverticulosis    CYSTOSCOPY Right 06/01/2017    right ureteroscopy, stone basket extraction, right ureteral stent    CYSTOSCOPY Right 11/8/2022    CYSTOSCOPY RIGHT STENT PLACEMENT performed by Todd Sant[facility] MD at [facility] ARTHROSCOPY      left    SHOULDER ARTHROSCOPY      right    TONSILLECTOMY AND ADENOIDECTOMY      UPPER GASTROINTESTINAL ENDOSCOPY  10/24/2012    EGD- normal     Family History   Problem Relation Age of Onset    Heart Disease Father         CAD onset 61    Stroke Father         CVA    Cancer Father         Bladder    Heart Disease Brother         CAD/MI onset 52's    Stroke Mother     High Cholesterol Mother     High Blood Pressure Mother     Heart Disease Paternal Uncle      Social History     Socioeconomic History    Marital status:      Spouse name: Not on file    Number of children: Not on file    Years of education: Not on file    Highest education level: Not on file   Occupational History    Not on file   Tobacco Use    Smoking status: Never     Passive exposure: Never    Smokeless tobacco: Never   Vaping Use    Vaping Use: Never used   Substance and Sexual Activity    Alcohol use: No     Comment: rarely    Drug use: No    Sexual activity: Not Currently     Partners: Male   Other Topics Concern    Not on file   Social History Narrative    Not on file     Social Determinants of Health     Financial Resource Strain: Not on file   Food Insecurity: Not on file   Transportation Needs: Not on file   Physical Activity: Not on file   Stress: Not on file   Social Connections: Not on file   Intimate Partner Violence: Not on file   Housing Stability: Not on file     No current facility-administered medications for this encounter.      Current Outpatient Medications   Medication Sig Dispense Refill    pantoprazole (PROTONIX) 40 MG tablet Take 40 mg by mouth daily      ketorolac (TORADOL) 10 MG tablet Take 1 tablet by mouth every 6 hours as needed for Pain 5 tablet 0    ondansetron (ZOFRAN ODT) 4 MG disintegrating tablet Take 1 tablet by mouth every 8 hours as needed for Nausea 20 tablet 0    tamsulosin (FLOMAX) 0.4 MG capsule Take 1 capsule by mouth daily for 14 doses 14 capsule 0    potassium citrate (UROCIT-K) 5 MEQ (540 MG) extended release tablet Take 5 mEq by mouth 3 times daily (with meals)      atorvastatin (LIPITOR) 40 MG tablet Take 40 mg by mouth daily      losartan (COZAAR) 50 MG tablet Take 50 mg by mouth daily      SYNTHROID 100 MCG tablet TAKE 1 TABLET DAILY 90 tablet 2    travoprost, benzalkonium, (TRAVATAN) 0.004 % ophthalmic solution Place 1 drop into both eyes nightly. Allergies   Allergen Reactions    Lisinopril Other (See Comments)     cough    Vicodin [Hydrocodone-Acetaminophen] Itching       Nursing Notes Reviewed    Physical Exam:  Triage VS:    ED Triage Vitals   Enc Vitals Group      BP       Pulse       Resp       Temp       Temp src       SpO2       Weight       Height       Head Circumference       Peak Flow       Pain Score       Pain Loc       Pain Edu? Excl. in [address]? My pulse ox interpretation is - normal    General appearance:  No acute distress. Skin:  Warm. Dry. Eye:  Extraocular movements intact. Ears, nose, mouth and throat:  Oral mucosa moist   Neck:  Trachea midline. Extremity:  No swelling. Normal ROM     Heart:  Regular rate and rhythm, normal S1 & S2, no extra heart sounds. Perfusion:  intact  Respiratory:  Lungs clear to auscultation bilaterally. Respirations nonlabored. Patient does have some discomfort over the left chest wall into the left upper chest wall patient states this feels similar to the pain she is feeling     Abdominal:  Normal bowel sounds. Soft. Nontender. Non distended. No flank pain, no CVA tenderness  Back:  No CVA tenderness to palpation     Neurological:  Alert and oriented times 3.   No focal neuro deficits. Psychiatric:  Appropriate    I have reviewed and interpreted all of the currently available lab results from this visit (if applicable):  No results found for this visit on 12/30/22. Radiographs (if obtained):  Radiologist's Report Reviewed:  No results found. EKG (if obtained): (All EKG's are interpreted by myself in the absence of a cardiologist)  Normal sinus rhythm, ventricular rate 90, UT interval 174, QRS duration 80, QTc 474, no significant ST elevation or depression    MDM:    71-year-old female presenting with history seen above. Vitals on presentation are reassuring and patient afebrile satting well on room air. Physical exam can be seen above. CBC reveals no leukocytosis. Hemoglobin is within normal limits. CMP is overall reassuring. TSH is within normal limits. UA is not consistent with infection patient denies any urinary symptoms. Magnesium is within normal limits. COVID and flu are negative. BNP is not elevated. EKG can be seen above. Trope and delta [place] are within normal limits. D-dimer was elevated so CT PE was obtained which reveals no evidence of pulmonary embolism or acute pulmonary abnormality. I had a long discussion with the patient as well as her daughter at bedside who is a nurse. I discussed that with her age and her risk factors that she has a moderate heart score and I would suggest admission. Discussion with them and shared decision making patient has an appointment with her cardiologist coming up and patient does not wish to stay in the hospital this time. I did discuss strict return precautions as well as close follow-up and patient agreeable to plan and discharged home. Clinical Impression:  1.  Chest pain, unspecified type        Comment: Please note this report has been produced using speech recognition software and may contain errors related to that system including errors in grammar, punctuation, and spelling, as well as words and phrases that may be inappropriate. Efforts were made to edit the dictations.         Cory Singh MD  01/08/23 1120

## 2023-01-01 LAB
EKG ATRIAL RATE: 90 BPM
EKG DIAGNOSIS: NORMAL
EKG P AXIS: 47 DEGREES
EKG P-R INTERVAL: 174 MS
EKG Q-T INTERVAL: 388 MS
EKG QRS DURATION: 80 MS
EKG QTC CALCULATION (BAZETT): 474 MS
EKG R AXIS: -18 DEGREES
EKG T AXIS: 52 DEGREES
EKG VENTRICULAR RATE: 90 BPM

## 2023-01-10 ENCOUNTER — HOSPITAL ENCOUNTER (OUTPATIENT)
Dept: ULTRASOUND IMAGING | Age: 73
Discharge: HOME | End: 2023-01-10
Payer: MEDICARE

## 2023-01-10 DIAGNOSIS — N39.41 URGE INCONTINENCE: ICD-10-CM

## 2023-01-10 DIAGNOSIS — R35.0 FREQUENCY OF MICTURITION: ICD-10-CM

## 2023-01-10 DIAGNOSIS — N20.0 CALCULUS OF KIDNEY: ICD-10-CM

## 2023-01-10 DIAGNOSIS — R10.9 ABDOMINAL PAIN, UNSPECIFIED ABDOMINAL LOCATION: ICD-10-CM

## 2023-01-10 PROCEDURE — 76770 US EXAM ABDO BACK WALL COMP: CPT

## 2023-05-03 NOTE — DISCHARGE SUMMARY
Outpatient Physical Therapy     [x] Daily Treatment Note   [] Progress Note 19, bottom of this page. ,  19    [x] Discharge Note 19      Date:  2019    Patient Name:  Alis Haines        :  1950    Restrictions/Precautions:  none    Diagnosis:  Left Rot cuff repair. Massive 3/14/19    Treatment Diagnosis:  Same. Sh pain, decr functional use    Insurance/Certification information:  Medicare and first choice    Referring Physician:  Dr. Kaitlin Lu of care signed (Y/N):      Visit# / total visits:   12        Her 12 th is her last visit. Pain level: 1/10 currently,   2/10 at worst W/N/T    Subjective:  19  Doing well. Feels she is at least 80% back to normal. See prog note below   19 has been busy with planlting grass and spreading straw. Her shoulder is more sore. 19 reports she is back to 70% use. See prog note   7/15/19 doing well. Has a tender/sore area on lateral left arm ( no pain with activity or with resistance with abd or ext). She will not poke it this week and see if it improves, and try not to sleep on her left side. 7/10/19 reports no n/t today but upper arm is really sore. She said she saw the MD and he thinks she just pinched a nerve in neck or elbow. Reports she did lift a heavy casarole dish into shelf over the weekend. 7/3/19   7/1/19 states she has done some shoveling and raking, kept her arm close to her body. 15.5 weeks post op  19  Reports she is sore today with certain movements in shoulder joint     Reports she is very active at home  19  Patient reports L shoulder is feeling really good. Only having pain with lifting light objects around the house. No reports of L shoulder pain that prevents sleeping. Has not been compliant with HEP due to being busy with house guests. Also, a little sore today for moving wood at her home. 19 reports she is able to do most things that she wants to do at home.  She is not
Hospitalist
yes

## 2024-01-02 ENCOUNTER — ANESTHESIA EVENT (OUTPATIENT)
Dept: ENDOSCOPY | Age: 74
End: 2024-01-02
Payer: MEDICARE

## 2024-01-02 NOTE — ANESTHESIA PRE PROCEDURE
arthritis:..                 Abdominal:             Vascular:          Other Findings:           Anesthesia Plan      general     ASA 3     (I discussed with the patient the risks and benefits of PIV, general anesthesia, IV Narcotics, PACU.  All questions were answered the patient agrees with the plan and wishes to proceed.  )  Induction: intravenous.                    Pre-Operative Diagnosis: Epigastric pain [R10.13]    73 y.o.   BMI:  Body mass index is 31.09 kg/m².     Vitals:    12/26/23 1447 01/04/24 1241   BP:  130/86   Pulse:  (!) 114   Resp:  17   Temp:  97.3 °F (36.3 °C)   TempSrc:  Infrared   SpO2:  97%   Weight: 77.1 kg (170 lb)    Height: 1.575 m (5' 2\")        Allergies   Allergen Reactions    Lisinopril Other (See Comments)     cough    Vicodin [Hydrocodone-Acetaminophen] Itching       Social History     Tobacco Use    Smoking status: Never     Passive exposure: Never    Smokeless tobacco: Never   Substance Use Topics    Alcohol use: No     Comment: rarely       LABS:    CBC  Lab Results   Component Value Date/Time    WBC 9.6 12/30/2022 10:24 PM    HGB 12.9 12/30/2022 10:24 PM    HCT 38.9 12/30/2022 10:24 PM     12/30/2022 10:24 PM     RENAL  Lab Results   Component Value Date/Time     12/30/2022 10:24 PM    K 4.2 12/30/2022 10:24 PM     12/30/2022 10:24 PM    CO2 25 12/30/2022 10:24 PM    BUN 20 12/30/2022 10:24 PM    CREATININE 1.0 12/30/2022 10:24 PM    GLUCOSE 124 (H) 12/30/2022 10:24 PM     COAGS  No results found for: \"PROTIME\", \"INR\", \"APTT\"    Lance Krishnan MD   1/2/2024

## 2024-01-03 NOTE — H&P
murmur noted    Lungs:  No increased work of breathing, good air exchange, clear to auscultation bilaterally, no crackles or wheezing    Abdomen:  No scars, normal bowel sounds, soft, non-distended, non-tender, no masses palpated, no hepatosplenomegally      ASA Class  ASA 2 - Patient with mild systemic disease with no functional limitations    Mallampati Class: 2      ASSESSMENT AND PLAN:    Epigastric pain in setting of recent atrial ablation; differentials include but not limited to esophagitis, peptic ulcer disease, post ablation thermal injury to esophagus  Continue Protonix 40mg orally daily, 30 minutes before breakfast  Plan for EGD with biopsy  To obtain cardiac clearance  Maintain anti-reflux measures with avoidance of carbonated/acid beverages, fried and fatty foods, peppermint, chocolate, alcohol, coffee, citrus fruits/juices, tomato products. Avoid lying down for 2 to 3 hours after eating.  [place] [facility]      1.  Patient is a suitable candidate for endoscopic procedure and attendant anesthesia  2.  Risks, benefits, alternatives of procedure discussed in detail with patient including risks of bleeding, infection, perforation, risks of sedation, risks of missed lesions. The patient wishes to proceed.

## 2024-01-04 ENCOUNTER — HOSPITAL ENCOUNTER (OUTPATIENT)
Age: 74
Setting detail: OUTPATIENT SURGERY
Discharge: HOME | End: 2024-01-04
Attending: INTERNAL MEDICINE | Admitting: INTERNAL MEDICINE
Payer: MEDICARE

## 2024-01-04 ENCOUNTER — ANESTHESIA (OUTPATIENT)
Dept: ENDOSCOPY | Age: 74
End: 2024-01-04
Payer: MEDICARE

## 2024-01-04 VITALS
SYSTOLIC BLOOD PRESSURE: 130 MMHG | BODY MASS INDEX: 31.28 KG/M2 | TEMPERATURE: 98.4 F | WEIGHT: 170 LBS | OXYGEN SATURATION: 92 % | RESPIRATION RATE: 16 BRPM | DIASTOLIC BLOOD PRESSURE: 66 MMHG | HEIGHT: 62 IN | HEART RATE: 88 BPM

## 2024-01-04 DIAGNOSIS — R10.13 EPIGASTRIC PAIN: ICD-10-CM

## 2024-01-04 PROCEDURE — 88305 TISSUE EXAM BY PATHOLOGIST: CPT

## 2024-01-04 PROCEDURE — 3700000001 HC ADD 15 MINUTES (ANESTHESIA): Performed by: INTERNAL MEDICINE

## 2024-01-04 PROCEDURE — 2709999900 HC NON-CHARGEABLE SUPPLY: Performed by: INTERNAL MEDICINE

## 2024-01-04 PROCEDURE — 2500000003 HC RX 250 WO HCPCS: Performed by: NURSE ANESTHETIST, CERTIFIED REGISTERED

## 2024-01-04 PROCEDURE — 6360000002 HC RX W HCPCS: Performed by: NURSE ANESTHETIST, CERTIFIED REGISTERED

## 2024-01-04 PROCEDURE — 7100000010 HC PHASE II RECOVERY - FIRST 15 MIN: Performed by: INTERNAL MEDICINE

## 2024-01-04 PROCEDURE — 3609012400 HC EGD TRANSORAL BIOPSY SINGLE/MULTIPLE: Performed by: INTERNAL MEDICINE

## 2024-01-04 PROCEDURE — 2580000003 HC RX 258: Performed by: NURSE ANESTHETIST, CERTIFIED REGISTERED

## 2024-01-04 PROCEDURE — 7100000011 HC PHASE II RECOVERY - ADDTL 15 MIN: Performed by: INTERNAL MEDICINE

## 2024-01-04 PROCEDURE — 3700000000 HC ANESTHESIA ATTENDED CARE: Performed by: INTERNAL MEDICINE

## 2024-01-04 RX ORDER — PROPOFOL 10 MG/ML
INJECTION, EMULSION INTRAVENOUS PRN
Status: DISCONTINUED | OUTPATIENT
Start: 2024-01-04 | End: 2024-01-04 | Stop reason: SDUPTHER

## 2024-01-04 RX ORDER — LIDOCAINE HYDROCHLORIDE 20 MG/ML
INJECTION, SOLUTION EPIDURAL; INFILTRATION; INTRACAUDAL; PERINEURAL PRN
Status: DISCONTINUED | OUTPATIENT
Start: 2024-01-04 | End: 2024-01-04 | Stop reason: SDUPTHER

## 2024-01-04 RX ORDER — SODIUM CHLORIDE 0.9 % (FLUSH) 0.9 %
5-40 SYRINGE (ML) INJECTION PRN
Status: DISCONTINUED | OUTPATIENT
Start: 2024-01-04 | End: 2024-01-04 | Stop reason: HOSPADM

## 2024-01-04 RX ORDER — SODIUM CHLORIDE 0.9 % (FLUSH) 0.9 %
5-40 SYRINGE (ML) INJECTION EVERY 12 HOURS SCHEDULED
Status: DISCONTINUED | OUTPATIENT
Start: 2024-01-04 | End: 2024-01-04 | Stop reason: HOSPADM

## 2024-01-04 RX ORDER — MIDAZOLAM HYDROCHLORIDE 1 MG/ML
2 INJECTION INTRAMUSCULAR; INTRAVENOUS
Status: DISCONTINUED | OUTPATIENT
Start: 2024-01-04 | End: 2024-01-04

## 2024-01-04 RX ORDER — SODIUM CHLORIDE 9 MG/ML
INJECTION, SOLUTION INTRAVENOUS PRN
Status: DISCONTINUED | OUTPATIENT
Start: 2024-01-04 | End: 2024-01-04 | Stop reason: HOSPADM

## 2024-01-04 RX ORDER — LIDOCAINE HYDROCHLORIDE 10 MG/ML
1 INJECTION, SOLUTION EPIDURAL; INFILTRATION; INTRACAUDAL; PERINEURAL
Status: DISCONTINUED | OUTPATIENT
Start: 2024-01-04 | End: 2024-01-04 | Stop reason: HOSPADM

## 2024-01-04 RX ORDER — SODIUM CHLORIDE, SODIUM LACTATE, POTASSIUM CHLORIDE, CALCIUM CHLORIDE 600; 310; 30; 20 MG/100ML; MG/100ML; MG/100ML; MG/100ML
INJECTION, SOLUTION INTRAVENOUS CONTINUOUS PRN
Status: DISCONTINUED | OUTPATIENT
Start: 2024-01-04 | End: 2024-01-04 | Stop reason: SDUPTHER

## 2024-01-04 RX ORDER — SODIUM CHLORIDE, SODIUM LACTATE, POTASSIUM CHLORIDE, CALCIUM CHLORIDE 600; 310; 30; 20 MG/100ML; MG/100ML; MG/100ML; MG/100ML
INJECTION, SOLUTION INTRAVENOUS CONTINUOUS
Status: DISCONTINUED | OUTPATIENT
Start: 2024-01-04 | End: 2024-01-04 | Stop reason: HOSPADM

## 2024-01-04 RX ADMIN — PROPOFOL 200 MG: 10 INJECTION, EMULSION INTRAVENOUS at 13:32

## 2024-01-04 RX ADMIN — SODIUM CHLORIDE, POTASSIUM CHLORIDE, SODIUM LACTATE AND CALCIUM CHLORIDE: 600; 310; 30; 20 INJECTION, SOLUTION INTRAVENOUS at 13:12

## 2024-01-04 RX ADMIN — LIDOCAINE HYDROCHLORIDE 60 MG: 20 INJECTION, SOLUTION EPIDURAL; INFILTRATION; INTRACAUDAL; PERINEURAL at 13:32

## 2024-01-04 ASSESSMENT — PAIN - FUNCTIONAL ASSESSMENT: PAIN_FUNCTIONAL_ASSESSMENT: NONE - DENIES PAIN

## 2024-01-04 NOTE — OP NOTE
[address] [place] [address],  [address]  [place] [address]  Phone: [phone]   Fax:[phone]  [address] [place] [address] ,  [address]  [place] [address]  Phone: [phone]   Fax:[phone]    EGD Procedure Note    Patient: Angie Giordano  : 1950    Procedure: Esophagogastroduodenoscopy with biopsy    Date:  2024     Endoscopist:  Efrem Logan MD    Referring Physician:  Reza Dempsey III, MD    Preoperative Diagnosis:  Epigastric pain [R10.13]    Postoperative Diagnosis:  gastritis    Anesthesia: Anesthesia: MAC  Sedation: Propofol per anesthesia  Start Time: 13:32  Stop Time: 13:37  ASA Class: 2  Mallampati: II (soft palate, uvula, fauces visible)    Indications: This is a 73 y.o. year old female  with medical history of hypertension, hyperlipidemia, hypothyroidism, GERD and ureterolithiasis status post right stent placement presents for abdominal pain. Reports epigastric 'gnawing' pain of about 1 month duraiton. Pain began after cardiac ablation procedure for atrial tachycardia on 23. Trial of carafate and GI cocktail provided minmal relief.     Procedure Details  Informed consent was obtained for the procedure, including conscious sedation. Risks of pancreatitis, infection, perforation, hemorrhage, adverse drug reaction and aspiration were discussed. The patient was placed in the left lateral decubitus position.  Based on the pre-procedure assessment, including review of the patient's medical history, medications, allergies, and review of systems, she had been deemed to be an appropriate candidate for the above IV sedation; she was therefore sedated with the medications listed above.  She was monitored continuously with ECG tracing, pulse oximetry, blood pressure monitoring, and direct observation. A gastroscope was inserted into the mouth and advanced under direct vision to second portion of the duodenum.  A careful inspection was made as the gastroscope was withdrawn, including a

## 2024-01-04 NOTE — DISCHARGE INSTRUCTIONS
PATIENT INSTRUCTIONS  POST-SEDATION    Angie Giordano      IMMEDIATELY FOLLOWING PROCEDURE:    Do not drive or operate machinery for the first twenty four hours after surgery.     Do not make any important decisions for twenty four hours after surgery or while taking narcotic pain medications or sedatives.     You should NOT BE LEFT UNATTENDED OR ALONE. A responsible adult should be with you for the rest of the day of your procedure and also during the night for your protection and safety.    You may experience some light headedness. Rest at home with activity as tolerated. You may not need to go to bed, but it is important to rest for the next 24 hours. You should not engage in athletic sports such as basketball, volleyball, jogging, skating, or activities requiring refined motor skills for 24 hours.   If you develop intractable nausea and vomiting or a severe headache please notify your doctor immediately.   You are not expected to have any fever, but if you feel warm, take your temperature. If you have a fever 101 degrees or higher, call your doctor.     If you have had an Endoscopy:   *Eat lightly for your first meal and gradually resume your normal / prescribed diet. DO NOT eat or drink until your gag reflex returns.   *If you have a sore throat you may use lozenges, or salt water gargles.      ONCE YOU ARE HOME, IF YOU SHOULD HAVE:  Difficulty in breathing, persistent nausea or vomiting, bleeding you feel is excessive, or pain that is unusual, increased abdominal bloating, or any swelling, fever / chills, call your physician. If you cannot contact your physician, but feel that your signs and symptoms need a physician's attention, go to the Emergency Department.      FOLLOW-UP:    Please follow up with Dr. Dempsey as scheduled or needed.    Dr. Logan's office will call you with the biopsy findings.  Call Dr. Logan if there are any GI concerns. [phone]

## 2024-01-04 NOTE — PROGRESS NOTES
Pre-Operative:  1.  Patient/Caregiver identifies - states name and date of birth.  2.  The patient is free from signs and symptoms of injury.  3.  The patient receives appropriate medication(s), safely administered during the Perioperative period.  4.  The patients's fluid, electrolyte, and acid-base balances are established preoperatively.  5.  The patient's pulmonary function is established preoperatively.  6.  The patient's cardiovascular status is established preoperatively.  7.  The patient / caregiver demonstrates knowledge of nutritional management related to the operative or other invasive procedure.  8.  The patient/caregiver demonstrates knowledge of medication management.  9.  The patient/caregiver demonstrates knowledge of pain management.  10.  The patient/caregiver participates in decisions affection his or her Perioperative plan of care.  11.  The patient's care is consistent with the individualized Perioperative plan of care.  12.  The patient's right to privacy is maintained.  13.  The patient is the recipient of competent and ethical care within legal standards of practice.  14.  The patient's value system, lifestyle, ethnicity, and culture are considered, respected, and incorporated in the Perioperative plan of care and understands special services available.  15.  The patient demonstrates and/or reports adequate pain control throughout the the Perioperative period.  16.  The patient's neurological status is established preoperatively.  17.  The patient/caregiver demonstrates knowledge of the expected responses to the endoscopy procedure.  18.  Patient/Caregiver has reduced anxiety.  Interventions- Familiarize with environment and equipment.  19. Patient/Caregiver verbalizes understanding of Phase II and/or Phase I process.  20.  Patient pain level is established preoperatively using age appropriate pain scale.  21.  The patient will move to fall risk upon sedation- during and through the recovery 
Pt to phase II recovery.  Pt stable easy to awaken.  Will continue to monitor.   
Pt verbalized understanding of verbal and written discharge instructions and denies having questions at this time. PIV removed without complication. Alert, denies pain or SOB. Taken via wheelchair to private car.   
vision between now and the time of your surgery.        To provide excellent care, visitors will be limited to two in a room at any given time.  Please no children under the age of 14 in the surgical department.

## 2024-01-04 NOTE — ANESTHESIA POSTPROCEDURE EVALUATION
1.0                 12/30/2022 10:24 PM        GLUCOSE                  124 (H)             12/30/2022 10:24 PM   COAGS  No results found for: \"PROTIME\", \"INR\", \"APTT\"    Intake & Output:  @24HRIO@    Nausea & Vomiting:  No    Level of Consciousness:  Awake    Pain Assessment:  Adequate analgesia    Anesthesia Complications:  No apparent anesthetic complications    SUMMARY      Vital signs stable  OK to discharge from Stage I post anesthesia care.  Care transferred from Anesthesiology department on discharge from perioperative area     No notable events documented.

## 2024-03-04 PROCEDURE — 99284 EMERGENCY DEPT VISIT MOD MDM: CPT

## 2024-03-05 ENCOUNTER — HOSPITAL ENCOUNTER (EMERGENCY)
Age: 74
Discharge: HOME | End: 2024-03-05
Attending: STUDENT IN AN ORGANIZED HEALTH CARE EDUCATION/TRAINING PROGRAM
Payer: MEDICARE

## 2024-03-05 ENCOUNTER — APPOINTMENT (OUTPATIENT)
Dept: CT IMAGING | Age: 74
End: 2024-03-05
Payer: MEDICARE

## 2024-03-05 VITALS
SYSTOLIC BLOOD PRESSURE: 136 MMHG | TEMPERATURE: 97.3 F | RESPIRATION RATE: 16 BRPM | HEART RATE: 68 BPM | DIASTOLIC BLOOD PRESSURE: 76 MMHG | OXYGEN SATURATION: 95 %

## 2024-03-05 DIAGNOSIS — N39.0 ACUTE UTI (URINARY TRACT INFECTION): Primary | ICD-10-CM

## 2024-03-05 LAB
ANION GAP SERPL CALCULATED.3IONS-SCNC: 8 MMOL/L (ref 3–16)
BACTERIA UR CULT: NORMAL
BACTERIA URNS QL MICRO: ABNORMAL /HPF
BASOPHILS # BLD: 0.1 K/UL (ref 0–0.2)
BASOPHILS NFR BLD: 0.7 %
BILIRUB UR QL STRIP.AUTO: NEGATIVE
BUN SERPL-MCNC: 23 MG/DL (ref 7–20)
CALCIUM SERPL-MCNC: 9.4 MG/DL (ref 8.3–10.6)
CHLORIDE SERPL-SCNC: 103 MMOL/L (ref 99–110)
CLARITY UR: CLEAR
CO2 SERPL-SCNC: 25 MMOL/L (ref 21–32)
COLOR UR: YELLOW
CREAT SERPL-MCNC: 0.7 MG/DL (ref 0.6–1.2)
DEPRECATED RDW RBC AUTO: 14.6 % (ref 12.4–15.4)
EOSINOPHIL # BLD: 0.3 K/UL (ref 0–0.6)
EOSINOPHIL NFR BLD: 2.7 %
EPI CELLS #/AREA URNS HPF: ABNORMAL /HPF (ref 0–5)
GFR SERPLBLD CREATININE-BSD FMLA CKD-EPI: >60 ML/MIN/{1.73_M2}
GLUCOSE SERPL-MCNC: 109 MG/DL (ref 70–99)
GLUCOSE UR STRIP.AUTO-MCNC: NEGATIVE MG/DL
HCT VFR BLD AUTO: 39.7 % (ref 36–48)
HGB BLD-MCNC: 13.3 G/DL (ref 12–16)
HGB UR QL STRIP.AUTO: ABNORMAL
KETONES UR STRIP.AUTO-MCNC: NEGATIVE MG/DL
LEUKOCYTE ESTERASE UR QL STRIP.AUTO: ABNORMAL
LYMPHOCYTES # BLD: 1.6 K/UL (ref 1–5.1)
LYMPHOCYTES NFR BLD: 16.5 %
MCH RBC QN AUTO: 29.1 PG (ref 26–34)
MCHC RBC AUTO-ENTMCNC: 33.5 G/DL (ref 31–36)
MCV RBC AUTO: 86.7 FL (ref 80–100)
MONOCYTES # BLD: 0.9 K/UL (ref 0–1.3)
MONOCYTES NFR BLD: 9 %
NEUTROPHILS # BLD: 6.8 K/UL (ref 1.7–7.7)
NEUTROPHILS NFR BLD: 71.1 %
NITRITE UR QL STRIP.AUTO: NEGATIVE
PH UR STRIP.AUTO: 6 [PH] (ref 5–8)
PLATELET # BLD AUTO: 271 K/UL (ref 135–450)
PMV BLD AUTO: 8.4 FL (ref 5–10.5)
POTASSIUM SERPL-SCNC: 5.2 MMOL/L (ref 3.5–5.1)
PROT UR STRIP.AUTO-MCNC: NEGATIVE MG/DL
RBC # BLD AUTO: 4.58 M/UL (ref 4–5.2)
RBC #/AREA URNS HPF: ABNORMAL /HPF (ref 0–4)
SODIUM SERPL-SCNC: 136 MMOL/L (ref 136–145)
SP GR UR STRIP.AUTO: <=1.005 (ref 1–1.03)
UA COMPLETE W REFLEX CULTURE PNL UR: YES
UA DIPSTICK W REFLEX MICRO PNL UR: YES
URN SPEC COLLECT METH UR: ABNORMAL
UROBILINOGEN UR STRIP-ACNC: 0.2 E.U./DL
WBC # BLD AUTO: 9.6 K/UL (ref 4–11)
WBC #/AREA URNS HPF: ABNORMAL /HPF (ref 0–5)

## 2024-03-05 PROCEDURE — 36415 COLL VENOUS BLD VENIPUNCTURE: CPT

## 2024-03-05 PROCEDURE — 87086 URINE CULTURE/COLONY COUNT: CPT

## 2024-03-05 PROCEDURE — 81001 URINALYSIS AUTO W/SCOPE: CPT

## 2024-03-05 PROCEDURE — 74176 CT ABD & PELVIS W/O CONTRAST: CPT

## 2024-03-05 PROCEDURE — 6370000000 HC RX 637 (ALT 250 FOR IP): Performed by: PHYSICIAN ASSISTANT

## 2024-03-05 PROCEDURE — 85025 COMPLETE CBC W/AUTO DIFF WBC: CPT

## 2024-03-05 PROCEDURE — 80048 BASIC METABOLIC PNL TOTAL CA: CPT

## 2024-03-05 RX ORDER — PHENAZOPYRIDINE HYDROCHLORIDE 100 MG/1
200 TABLET, FILM COATED ORAL ONCE
Status: COMPLETED | OUTPATIENT
Start: 2024-03-05 | End: 2024-03-05

## 2024-03-05 RX ORDER — CEFUROXIME AXETIL 250 MG/1
250 TABLET ORAL 2 TIMES DAILY
Qty: 14 TABLET | Refills: 0 | Status: SHIPPED | OUTPATIENT
Start: 2024-03-05 | End: 2024-03-12

## 2024-03-05 RX ADMIN — PHENAZOPYRIDINE HYDROCHLORIDE 200 MG: 100 TABLET ORAL at 01:56

## 2024-03-05 ASSESSMENT — PAIN SCALES - GENERAL
PAINLEVEL_OUTOF10: 3
PAINLEVEL_OUTOF10: 3

## 2024-03-05 ASSESSMENT — PAIN DESCRIPTION - ORIENTATION: ORIENTATION: LOWER

## 2024-03-05 ASSESSMENT — PAIN DESCRIPTION - LOCATION
LOCATION: VAGINA
LOCATION: ABDOMEN

## 2024-03-05 ASSESSMENT — PAIN - FUNCTIONAL ASSESSMENT
PAIN_FUNCTIONAL_ASSESSMENT: 0-10
PAIN_FUNCTIONAL_ASSESSMENT: NONE - DENIES PAIN

## 2024-03-05 ASSESSMENT — PAIN DESCRIPTION - DESCRIPTORS: DESCRIPTORS: CRAMPING;PRESSURE

## 2024-03-05 ASSESSMENT — PAIN DESCRIPTION - PAIN TYPE: TYPE: ACUTE PAIN

## 2024-03-05 NOTE — ED PROVIDER NOTES
I independently examined and evaluated Angie Giordano.  I personally saw the patient and performed a substantive portion of the visit including all aspects of the medical decision making.    In brief, this 74-year-old female is presenting with lower abdominal pain and pressure.  Recently passed stone.  Saw urology earlier today and was started on Keflex for suspected UTI.  Denies any fevers, chills, nausea, vomiting.  Presented here due to worsening pain.  States that she passed a clot before arrival and has been having improvement in her pain since then though she has had hematuria.    Focused exam revealed   General: Alert, no acute distress, patient resting comfortably   Skin: warm, intact, no pallor noted   Head: Normocephalic, atraumatic   Eye: Normal conjunctiva   Cardiac: Normal peripheral perfusion  Respiratory: No acute distress   Musculoskeletal: No deformity, full ROM.   Neurological: alert and oriented, normal sensory and motor observed.   Psychiatric: Cooperative    ED course: Labwork obtained and is reassuring.  Urinalysis concerning for UTI.  CT abdomen pelvis shows some mild left hydro, but no obvious obstruction.  Suspect from a recently passed stone based on her history.  Discussed results with the patient and she is reassured.  Advised of poor resistance pattern to Keflex on hospital antibiogram and gave her prescription for cefuroxime which she was agreeable to.    All diagnostic, treatment, and disposition decisions were made by myself in conjunction with the advanced practice provider/resident.    I personally saw the patient and made/approved the management plan and take responsibility for the patient management.     For all further details of the patient's emergency department visit, please see the advanced practice provider's/resident's documentation.    Comment: Please note this report has been produced using speech recognition software and may contain errors related to that system 
SMALL (*)     All other components within normal limits   BASIC METABOLIC PANEL W/ REFLEX TO MG FOR LOW K - Abnormal; Notable for the following components:    Potassium reflex Magnesium 5.2 (*)     Glucose 109 (*)     BUN 23 (*)     All other components within normal limits   MICROSCOPIC URINALYSIS - Abnormal; Notable for the following components:    WBC, UA 10-20 (*)     RBC, UA 21-50 (*)     Epithelial Cells, UA 6-10 (*)     Bacteria, UA 2+ (*)     All other components within normal limits   CULTURE, URINE   CBC WITH AUTO DIFFERENTIAL       When ordered only abnormal lab results are displayed. All other labs were within normal range or not returned as of this dictation.    EKG: When ordered, EKG's are interpreted by the Emergency Department Physician in the absence of a cardiologist.  Please see their note for interpretation of EKG.    RADIOLOGY:   Non-plain film images such as CT, Ultrasound and MRI are read by the radiologist. Plain radiographic images are visualized and preliminarily interpreted by the ED Provider with the below findings:        Interpretation per the Radiologist below, if available at the time of this note:    CT ABDOMEN PELVIS WO CONTRAST Additional Contrast? None   Final Result   1. Mild left hydronephrosis and hydroureter may be due to recently passed   stone or to vesical ureteral reflux.   2. Peripelvic cysts at the left kidney with mild perinephric stranding/edema.   3. Small nonobstructing left renal calculus.   4. Colonic diverticulosis without evidence of acute diverticulitis.           No results found.    No results found.    PROCEDURES   Unless otherwise noted below, none     Procedures    CRITICAL CARE TIME (.cctime)       PAST MEDICAL HISTORY      has a past medical history of Basal cell carcinoma (2016), GERD (gastroesophageal reflux disease), Glaucoma, History of bleeding ulcers (1970's), Hyperlipidemia, Hypertension, Hypothyroidism, Kidney stone, Osteoarthritis, PONV

## 2024-11-12 ENCOUNTER — HOSPITAL ENCOUNTER (OUTPATIENT)
Age: 74
Discharge: HOME | End: 2024-11-12
Payer: MEDICARE

## 2024-11-12 DIAGNOSIS — R10.13 EPIGASTRIC PAIN: ICD-10-CM

## 2024-11-12 DIAGNOSIS — R10.13 EPIGASTRIC PAIN: Primary | ICD-10-CM

## 2024-11-12 LAB
ALBUMIN SERPL-MCNC: 3.9 G/DL (ref 3.4–5)
ALP SERPL-CCNC: 87 U/L (ref 40–129)
ALT SERPL-CCNC: 15 U/L (ref 10–40)
AST SERPL-CCNC: 21 U/L (ref 15–37)
BILIRUB DIRECT SERPL-MCNC: <0.1 MG/DL (ref 0–0.3)
BILIRUB INDIRECT SERPL-MCNC: NORMAL MG/DL (ref 0–1)
BILIRUB SERPL-MCNC: <0.2 MG/DL (ref 0–1)
LIPASE SERPL-CCNC: 27 U/L (ref 13–60)
PROT SERPL-MCNC: 6.8 G/DL (ref 6.4–8.2)
TSH SERPL DL<=0.005 MIU/L-ACNC: 0.78 UIU/ML (ref 0.27–4.2)

## 2024-11-12 PROCEDURE — 36415 COLL VENOUS BLD VENIPUNCTURE: CPT

## 2024-11-12 PROCEDURE — 84443 ASSAY THYROID STIM HORMONE: CPT

## 2024-11-12 PROCEDURE — 83690 ASSAY OF LIPASE: CPT

## 2024-11-12 PROCEDURE — 80076 HEPATIC FUNCTION PANEL: CPT

## 2025-04-03 ENCOUNTER — HOSPITAL ENCOUNTER (INPATIENT)
Age: 75
LOS: 1 days | Discharge: HOME | DRG: 392 | End: 2025-04-04
Attending: INTERNAL MEDICINE | Admitting: INTERNAL MEDICINE
Payer: MEDICARE

## 2025-04-03 ENCOUNTER — APPOINTMENT (OUTPATIENT)
Dept: CT IMAGING | Age: 75
DRG: 392 | End: 2025-04-03
Payer: MEDICARE

## 2025-04-03 DIAGNOSIS — K57.32 DIVERTICULITIS OF COLON: Primary | ICD-10-CM

## 2025-04-03 DIAGNOSIS — R31.9 HEMATURIA, UNSPECIFIED TYPE: ICD-10-CM

## 2025-04-03 DIAGNOSIS — R06.02 SHORTNESS OF BREATH: ICD-10-CM

## 2025-04-03 PROBLEM — E83.42 HYPOMAGNESEMIA: Status: ACTIVE | Noted: 2025-04-03

## 2025-04-03 LAB
ALBUMIN SERPL-MCNC: 3.8 G/DL (ref 3.4–5)
ALBUMIN/GLOB SERPL: 1.2 {RATIO} (ref 1.1–2.2)
ALP SERPL-CCNC: 94 U/L (ref 40–129)
ALT SERPL-CCNC: 15 U/L (ref 10–40)
ANION GAP SERPL CALCULATED.3IONS-SCNC: 13 MMOL/L (ref 3–16)
AST SERPL-CCNC: 24 U/L (ref 15–37)
BACTERIA URNS QL MICRO: ABNORMAL /HPF
BASOPHILS # BLD: 0 K/UL (ref 0–0.2)
BASOPHILS NFR BLD: 0.5 %
BILIRUB SERPL-MCNC: 0.4 MG/DL (ref 0–1)
BILIRUB UR QL STRIP.AUTO: NEGATIVE
BUN SERPL-MCNC: 19 MG/DL (ref 7–20)
CALCIUM SERPL-MCNC: 9 MG/DL (ref 8.3–10.6)
CHLORIDE SERPL-SCNC: 106 MMOL/L (ref 99–110)
CLARITY UR: ABNORMAL
CO2 SERPL-SCNC: 23 MMOL/L (ref 21–32)
COLOR UR: ABNORMAL
CREAT SERPL-MCNC: 0.8 MG/DL (ref 0.6–1.2)
DEPRECATED RDW RBC AUTO: 14.3 % (ref 12.4–15.4)
EOSINOPHIL # BLD: 0.2 K/UL (ref 0–0.6)
EOSINOPHIL NFR BLD: 2.4 %
EPI CELLS #/AREA URNS HPF: ABNORMAL /HPF (ref 0–5)
FLUAV RNA RESP QL NAA+PROBE: NOT DETECTED
FLUBV RNA RESP QL NAA+PROBE: NOT DETECTED
GFR SERPLBLD CREATININE-BSD FMLA CKD-EPI: 77 ML/MIN/{1.73_M2}
GLUCOSE SERPL-MCNC: 100 MG/DL (ref 70–99)
GLUCOSE UR STRIP.AUTO-MCNC: NEGATIVE MG/DL
HCT VFR BLD AUTO: 41.9 % (ref 36–48)
HGB BLD-MCNC: 13.8 G/DL (ref 12–16)
HGB UR QL STRIP.AUTO: ABNORMAL
KETONES UR STRIP.AUTO-MCNC: NEGATIVE MG/DL
LACTATE BLDV-SCNC: 1 MMOL/L (ref 0.4–1.9)
LEUKOCYTE ESTERASE UR QL STRIP.AUTO: ABNORMAL
LYMPHOCYTES # BLD: 1.1 K/UL (ref 1–5.1)
LYMPHOCYTES NFR BLD: 14 %
MAGNESIUM SERPL-MCNC: 1.6 MG/DL (ref 1.8–2.4)
MCH RBC QN AUTO: 29.2 PG (ref 26–34)
MCHC RBC AUTO-ENTMCNC: 33 G/DL (ref 31–36)
MCV RBC AUTO: 88.7 FL (ref 80–100)
MONOCYTES # BLD: 0.6 K/UL (ref 0–1.3)
MONOCYTES NFR BLD: 8.1 %
NEUTROPHILS # BLD: 5.7 K/UL (ref 1.7–7.7)
NEUTROPHILS NFR BLD: 75 %
NITRITE UR QL STRIP.AUTO: NEGATIVE
PH UR STRIP.AUTO: 6 [PH] (ref 5–8)
PLATELET # BLD AUTO: 255 K/UL (ref 135–450)
PMV BLD AUTO: 8.1 FL (ref 5–10.5)
POTASSIUM SERPL-SCNC: 4.2 MMOL/L (ref 3.5–5.1)
PROT SERPL-MCNC: 7.1 G/DL (ref 6.4–8.2)
PROT UR STRIP.AUTO-MCNC: 100 MG/DL
RBC # BLD AUTO: 4.73 M/UL (ref 4–5.2)
RBC #/AREA URNS HPF: >100 /HPF (ref 0–4)
SARS-COV-2 RNA RESP QL NAA+PROBE: NOT DETECTED
SODIUM SERPL-SCNC: 142 MMOL/L (ref 136–145)
SP GR UR STRIP.AUTO: <=1.005 (ref 1–1.03)
TROPONIN, HIGH SENSITIVITY: 7 NG/L (ref 0–14)
TROPONIN, HIGH SENSITIVITY: 8 NG/L (ref 0–14)
UA COMPLETE W REFLEX CULTURE PNL UR: YES
UA DIPSTICK W REFLEX MICRO PNL UR: YES
URN SPEC COLLECT METH UR: ABNORMAL
UROBILINOGEN UR STRIP-ACNC: 0.2 E.U./DL
WBC # BLD AUTO: 7.6 K/UL (ref 4–11)
WBC #/AREA URNS HPF: ABNORMAL /HPF (ref 0–5)

## 2025-04-03 PROCEDURE — 85025 COMPLETE CBC W/AUTO DIFF WBC: CPT

## 2025-04-03 PROCEDURE — 83605 ASSAY OF LACTIC ACID: CPT

## 2025-04-03 PROCEDURE — 36415 COLL VENOUS BLD VENIPUNCTURE: CPT

## 2025-04-03 PROCEDURE — 6360000004 HC RX CONTRAST MEDICATION: Performed by: NURSE PRACTITIONER

## 2025-04-03 PROCEDURE — 87636 SARSCOV2 & INF A&B AMP PRB: CPT

## 2025-04-03 PROCEDURE — 6360000002 HC RX W HCPCS: Performed by: NURSE PRACTITIONER

## 2025-04-03 PROCEDURE — 93005 ELECTROCARDIOGRAM TRACING: CPT

## 2025-04-03 PROCEDURE — 99223 1ST HOSP IP/OBS HIGH 75: CPT

## 2025-04-03 PROCEDURE — 6370000000 HC RX 637 (ALT 250 FOR IP)

## 2025-04-03 PROCEDURE — 80053 COMPREHEN METABOLIC PANEL: CPT

## 2025-04-03 PROCEDURE — 96375 TX/PRO/DX INJ NEW DRUG ADDON: CPT

## 2025-04-03 PROCEDURE — 1200000000 HC SEMI PRIVATE

## 2025-04-03 PROCEDURE — 6360000002 HC RX W HCPCS

## 2025-04-03 PROCEDURE — 2500000003 HC RX 250 WO HCPCS

## 2025-04-03 PROCEDURE — 83735 ASSAY OF MAGNESIUM: CPT

## 2025-04-03 PROCEDURE — 74177 CT ABD & PELVIS W/CONTRAST: CPT

## 2025-04-03 PROCEDURE — 96365 THER/PROPH/DIAG IV INF INIT: CPT

## 2025-04-03 PROCEDURE — 84484 ASSAY OF TROPONIN QUANT: CPT

## 2025-04-03 PROCEDURE — 99285 EMERGENCY DEPT VISIT HI MDM: CPT

## 2025-04-03 PROCEDURE — 96366 THER/PROPH/DIAG IV INF ADDON: CPT

## 2025-04-03 PROCEDURE — 87086 URINE CULTURE/COLONY COUNT: CPT

## 2025-04-03 PROCEDURE — 81001 URINALYSIS AUTO W/SCOPE: CPT

## 2025-04-03 PROCEDURE — 2580000003 HC RX 258

## 2025-04-03 RX ORDER — ATORVASTATIN CALCIUM 40 MG/1
40 TABLET, FILM COATED ORAL DAILY
Status: DISCONTINUED | OUTPATIENT
Start: 2025-04-03 | End: 2025-04-04 | Stop reason: HOSPADM

## 2025-04-03 RX ORDER — SODIUM CHLORIDE 0.9 % (FLUSH) 0.9 %
5-40 SYRINGE (ML) INJECTION PRN
Status: DISCONTINUED | OUTPATIENT
Start: 2025-04-03 | End: 2025-04-04 | Stop reason: HOSPADM

## 2025-04-03 RX ORDER — MORPHINE SULFATE 4 MG/ML
4 INJECTION, SOLUTION INTRAMUSCULAR; INTRAVENOUS ONCE
Refills: 0 | Status: COMPLETED | OUTPATIENT
Start: 2025-04-03 | End: 2025-04-03

## 2025-04-03 RX ORDER — SODIUM CHLORIDE 0.9 % (FLUSH) 0.9 %
5-40 SYRINGE (ML) INJECTION EVERY 12 HOURS SCHEDULED
Status: DISCONTINUED | OUTPATIENT
Start: 2025-04-03 | End: 2025-04-04 | Stop reason: HOSPADM

## 2025-04-03 RX ORDER — LOSARTAN POTASSIUM 50 MG/1
50 TABLET ORAL DAILY
Status: DISCONTINUED | OUTPATIENT
Start: 2025-04-03 | End: 2025-04-04 | Stop reason: HOSPADM

## 2025-04-03 RX ORDER — METOPROLOL SUCCINATE 50 MG/1
50 TABLET, EXTENDED RELEASE ORAL DAILY
Status: DISCONTINUED | OUTPATIENT
Start: 2025-04-03 | End: 2025-04-04 | Stop reason: HOSPADM

## 2025-04-03 RX ORDER — CIPROFLOXACIN 2 MG/ML
400 INJECTION, SOLUTION INTRAVENOUS EVERY 12 HOURS
Status: DISCONTINUED | OUTPATIENT
Start: 2025-04-04 | End: 2025-04-04 | Stop reason: HOSPADM

## 2025-04-03 RX ORDER — SODIUM CHLORIDE 9 MG/ML
INJECTION, SOLUTION INTRAVENOUS CONTINUOUS
Status: DISCONTINUED | OUTPATIENT
Start: 2025-04-03 | End: 2025-04-04 | Stop reason: HOSPADM

## 2025-04-03 RX ORDER — ONDANSETRON 2 MG/ML
4 INJECTION INTRAMUSCULAR; INTRAVENOUS ONCE
Status: COMPLETED | OUTPATIENT
Start: 2025-04-03 | End: 2025-04-03

## 2025-04-03 RX ORDER — METRONIDAZOLE 500 MG/100ML
500 INJECTION, SOLUTION INTRAVENOUS EVERY 8 HOURS
Status: DISCONTINUED | OUTPATIENT
Start: 2025-04-04 | End: 2025-04-04 | Stop reason: HOSPADM

## 2025-04-03 RX ORDER — IOPAMIDOL 755 MG/ML
75 INJECTION, SOLUTION INTRAVASCULAR
Status: COMPLETED | OUTPATIENT
Start: 2025-04-03 | End: 2025-04-03

## 2025-04-03 RX ORDER — ONDANSETRON 4 MG/1
4 TABLET, ORALLY DISINTEGRATING ORAL EVERY 8 HOURS PRN
Status: DISCONTINUED | OUTPATIENT
Start: 2025-04-03 | End: 2025-04-04 | Stop reason: HOSPADM

## 2025-04-03 RX ORDER — CIPROFLOXACIN 2 MG/ML
400 INJECTION, SOLUTION INTRAVENOUS ONCE
Status: COMPLETED | OUTPATIENT
Start: 2025-04-03 | End: 2025-04-03

## 2025-04-03 RX ORDER — METRONIDAZOLE 500 MG/100ML
500 INJECTION, SOLUTION INTRAVENOUS EVERY 8 HOURS
Status: DISCONTINUED | OUTPATIENT
Start: 2025-04-03 | End: 2025-04-03

## 2025-04-03 RX ORDER — OXYCODONE HYDROCHLORIDE 5 MG/1
5 TABLET ORAL EVERY 6 HOURS PRN
Status: DISCONTINUED | OUTPATIENT
Start: 2025-04-03 | End: 2025-04-04 | Stop reason: HOSPADM

## 2025-04-03 RX ORDER — SODIUM CHLORIDE 9 MG/ML
INJECTION, SOLUTION INTRAVENOUS PRN
Status: DISCONTINUED | OUTPATIENT
Start: 2025-04-03 | End: 2025-04-04 | Stop reason: HOSPADM

## 2025-04-03 RX ORDER — METOPROLOL SUCCINATE 50 MG/1
50 TABLET, EXTENDED RELEASE ORAL DAILY
COMMUNITY

## 2025-04-03 RX ORDER — ONDANSETRON 2 MG/ML
4 INJECTION INTRAMUSCULAR; INTRAVENOUS EVERY 6 HOURS PRN
Status: DISCONTINUED | OUTPATIENT
Start: 2025-04-03 | End: 2025-04-04 | Stop reason: HOSPADM

## 2025-04-03 RX ORDER — LEVOTHYROXINE SODIUM 100 UG/1
100 TABLET ORAL DAILY
Status: DISCONTINUED | OUTPATIENT
Start: 2025-04-03 | End: 2025-04-04 | Stop reason: HOSPADM

## 2025-04-03 RX ORDER — MAGNESIUM SULFATE IN WATER 40 MG/ML
2000 INJECTION, SOLUTION INTRAVENOUS ONCE
Status: COMPLETED | OUTPATIENT
Start: 2025-04-03 | End: 2025-04-03

## 2025-04-03 RX ADMIN — Medication 10 ML: at 20:06

## 2025-04-03 RX ADMIN — OXYCODONE HYDROCHLORIDE 5 MG: 5 TABLET ORAL at 18:55

## 2025-04-03 RX ADMIN — MORPHINE SULFATE 4 MG: 4 INJECTION, SOLUTION INTRAMUSCULAR; INTRAVENOUS at 12:19

## 2025-04-03 RX ADMIN — ONDANSETRON 4 MG: 2 INJECTION, SOLUTION INTRAMUSCULAR; INTRAVENOUS at 20:29

## 2025-04-03 RX ADMIN — MAGNESIUM SULFATE HEPTAHYDRATE 2000 MG: 40 INJECTION, SOLUTION INTRAVENOUS at 18:52

## 2025-04-03 RX ADMIN — Medication 40 MG: at 20:05

## 2025-04-03 RX ADMIN — IOPAMIDOL 75 ML: 755 INJECTION, SOLUTION INTRAVENOUS at 12:56

## 2025-04-03 RX ADMIN — CIPROFLOXACIN 400 MG: 2 INJECTION, SOLUTION INTRAVENOUS at 14:24

## 2025-04-03 RX ADMIN — METRONIDAZOLE 500 MG: 500 INJECTION, SOLUTION INTRAVENOUS at 23:21

## 2025-04-03 RX ADMIN — SODIUM CHLORIDE: 0.9 INJECTION, SOLUTION INTRAVENOUS at 18:51

## 2025-04-03 RX ADMIN — METRONIDAZOLE 500 MG: 500 INJECTION, SOLUTION INTRAVENOUS at 16:35

## 2025-04-03 RX ADMIN — ATORVASTATIN CALCIUM 40 MG: 40 TABLET, FILM COATED ORAL at 20:05

## 2025-04-03 RX ADMIN — ONDANSETRON 4 MG: 2 INJECTION, SOLUTION INTRAMUSCULAR; INTRAVENOUS at 12:14

## 2025-04-03 ASSESSMENT — ENCOUNTER SYMPTOMS
COLOR CHANGE: 0
FACIAL SWELLING: 0
SHORTNESS OF BREATH: 0
ABDOMINAL PAIN: 1
SORE THROAT: 0
RHINORRHEA: 0
NAUSEA: 1

## 2025-04-03 ASSESSMENT — PAIN DESCRIPTION - DESCRIPTORS: DESCRIPTORS: ACHING

## 2025-04-03 ASSESSMENT — PAIN - FUNCTIONAL ASSESSMENT: PAIN_FUNCTIONAL_ASSESSMENT: ACTIVITIES ARE NOT PREVENTED

## 2025-04-03 ASSESSMENT — PAIN DESCRIPTION - ORIENTATION: ORIENTATION: UPPER

## 2025-04-03 ASSESSMENT — PAIN DESCRIPTION - LOCATION: LOCATION: ABDOMEN

## 2025-04-03 ASSESSMENT — PAIN SCALES - GENERAL: PAINLEVEL_OUTOF10: 7

## 2025-04-03 NOTE — ED NOTES
Patient resting in bed at this time, denies any needs. Call light within reach and family at bedside

## 2025-04-03 NOTE — PROGRESS NOTES
Bedside report given to Silvia MCCARTHY. Pt denies needs at this time. No s/s of distress. Respirations easy and unlabored. Pt stable. Bed in low position call light within reach. No further needs at this time.

## 2025-04-03 NOTE — ED PROVIDER NOTES
[facility] EMERGENCY DEPARTMENT  EMERGENCY DEPARTMENT ENCOUNTER      I am the Primary Clinician of Record    Note started: 1:33 PM EDT 4/3/25    ÓCSAR. I have evaluated this patient.          Pt Name: Angie Giordano  MRN: [id number]  Birthdate 1950  Dateof evaluation: 4/3/2025  Provider: Vangie Eric, APRN - CNP  PCP: Reza Dempsey III, MD  ED Attending: No att. providers found      CHIEF COMPLAINT       Chief Complaint   Patient presents with    Abdominal Pain     Lower abd pain.  Blood in urine.       HISTORY OF PRESENTILLNESS   (Location/Symptom, Timing/Onset, Context/Setting, Quality, Duration, Modifying Factors, Severity)  Note limiting factors.     Angie Giordano is a 75 y.o. female for hematuria. Onset was last night.  Context includes patient states that she started having hematuria last night with lower abdominal cramping.  Patient does have a history of kidney stones.  She also reports that she has had nausea.  She denies any fevers. Alleviating factors include nothing.  Aggravating factors include nothing.  Nothing has been used for pain today.     Nursing Notes were all reviewed and agreed with or any disagreements were addressed  in the HPI.      REVIEW OF SYSTEMS       Review of Systems   Constitutional:  Negative for activity change, appetite change and fever.   HENT:  Negative for congestion, facial swelling, rhinorrhea and sore throat.    Eyes:  Negative for visual disturbance.   Respiratory:  Negative for shortness of breath.    Cardiovascular:  Negative for chest pain.   Gastrointestinal:  Positive for abdominal pain and nausea.   Genitourinary:  Positive for hematuria. Negative for difficulty urinating.   Musculoskeletal:  Negative for arthralgias and myalgias.   Skin:  Negative for color change and rash.   Neurological:  Negative for dizziness and light-headedness.   Psychiatric/Behavioral:  Negative for agitation.    All other systems reviewed and are

## 2025-04-03 NOTE — H&P
Hospital Medicine History & Physical      PCP: Reza Dempsey III, MD    Date of Admission: 4/3/2025    Date of Service: Pt seen/examined on 4/3/2025     Chief Complaint:    Chief Complaint   Patient presents with    Abdominal Pain     Lower abd pain.  Blood in urine.         History Of Present Illness:      The patient is a 75 y.o. female with pmhx of HTN, HLD, hx of SVT, PUD who presented to [facility] ED with complaint of bloody urine. Patient reports she noticed some last night but then today she was peeing straight blood, had a couple episodes. Did not notice any clots. Last episode was here in the hospital and was more pink in color. Is having some burning in her urethra as well and suprapubic area. Has had kidney stones before that is the only other time she has had blood in her urine. No vaginal discharge. Denies any flank pain. Is having some epigastric discomfort and nausea as well. No vomiting. Epigastric pain is gnawing, aching in nature. Has had ulcers before and this feels similar. Does not take NSAIDs, no blood thinner, no alcohol use.   No fever, chills, diarrhea.   Did have some mild left sided chest pain when she first arrived but it resolved on its own. No radiation. No associated shortness of breath but did notice some shortness of breath with exertion when cleaning her chick coop yesterday.     Past Medical History:        Diagnosis Date    Basal cell carcinoma 2016    nasal     GERD (gastroesophageal reflux disease)     Glaucoma     History of bleeding ulcers 1970's    admitted    Hyperlipidemia     Hypertension     Hypothyroidism     Kidney stone     Osteoarthritis     left knee    PONV (postoperative nausea and vomiting)     with general    Postmenopausal        Past Surgical History:        Procedure Laterality Date    APPENDECTOMY      CHOLECYSTECTOMY  2008    COLONOSCOPY  10/24/2012    diverticulosis    CYSTOSCOPY Right 06/01/2017    right ureteroscopy, stone basket extraction,

## 2025-04-04 ENCOUNTER — APPOINTMENT (OUTPATIENT)
Age: 75
DRG: 392 | End: 2025-04-04
Payer: MEDICARE

## 2025-04-04 VITALS
TEMPERATURE: 98.4 F | WEIGHT: 173 LBS | HEIGHT: 62 IN | BODY MASS INDEX: 31.83 KG/M2 | SYSTOLIC BLOOD PRESSURE: 143 MMHG | OXYGEN SATURATION: 95 % | DIASTOLIC BLOOD PRESSURE: 81 MMHG | RESPIRATION RATE: 16 BRPM | HEART RATE: 65 BPM

## 2025-04-04 LAB
ANION GAP SERPL CALCULATED.3IONS-SCNC: 14 MMOL/L (ref 3–16)
BACTERIA UR CULT: NORMAL
BASOPHILS # BLD: 0 K/UL (ref 0–0.2)
BASOPHILS NFR BLD: 0.4 %
BUN SERPL-MCNC: 14 MG/DL (ref 7–20)
CALCIUM SERPL-MCNC: 8.9 MG/DL (ref 8.3–10.6)
CHLORIDE SERPL-SCNC: 107 MMOL/L (ref 99–110)
CO2 SERPL-SCNC: 15 MMOL/L (ref 21–32)
CREAT SERPL-MCNC: 0.7 MG/DL (ref 0.6–1.2)
DEPRECATED RDW RBC AUTO: 14.4 % (ref 12.4–15.4)
ECHO AO ASC DIAM: 3 CM
ECHO AO ASCENDING AORTA INDEX: 1.67 CM/M2
ECHO AO ROOT DIAM: 3.2 CM
ECHO AO ROOT INDEX: 1.78 CM/M2
ECHO AV MEAN GRADIENT: 5 MMHG
ECHO AV MEAN VELOCITY: 1 M/S
ECHO AV PEAK GRADIENT: 8 MMHG
ECHO AV PEAK VELOCITY: 1.4 M/S
ECHO AV VELOCITY RATIO: 0.71
ECHO AV VTI: 32.8 CM
ECHO BSA: 1.85 M2
ECHO EST RA PRESSURE: 3 MMHG
ECHO IVC EXP: 1.4 CM
ECHO IVC INSP: 0.4 CM
ECHO LA AREA 2C: 18.5 CM2
ECHO LA AREA 4C: 18.5 CM2
ECHO LA MAJOR AXIS: 5.1 CM
ECHO LA MINOR AXIS: 5.9 CM
ECHO LA VOL BP: 48 ML (ref 22–52)
ECHO LA VOL MOD A2C: 48 ML (ref 22–52)
ECHO LA VOL MOD A4C: 50 ML (ref 22–52)
ECHO LA VOL/BSA BIPLANE: 27 ML/M2 (ref 16–34)
ECHO LA VOLUME INDEX MOD A2C: 27 ML/M2 (ref 16–34)
ECHO LA VOLUME INDEX MOD A4C: 28 ML/M2 (ref 16–34)
ECHO LV E' LATERAL VELOCITY: 7.62 CM/S
ECHO LV E' SEPTAL VELOCITY: 8.39 CM/S
ECHO LV EDV A2C: 59 ML
ECHO LV EDV A4C: 71 ML
ECHO LV EDV INDEX A4C: 39 ML/M2
ECHO LV EDV NDEX A2C: 33 ML/M2
ECHO LV EF PHYSICIAN: 62 %
ECHO LV EJECTION FRACTION A2C: 61 %
ECHO LV EJECTION FRACTION A4C: 66 %
ECHO LV EJECTION FRACTION BIPLANE: 64 % (ref 55–100)
ECHO LV ESV A2C: 23 ML
ECHO LV ESV A4C: 24 ML
ECHO LV ESV INDEX A2C: 13 ML/M2
ECHO LV ESV INDEX A4C: 13 ML/M2
ECHO LV FRACTIONAL SHORTENING: 36 % (ref 28–44)
ECHO LV INTERNAL DIMENSION DIASTOLE INDEX: 2.78 CM/M2
ECHO LV INTERNAL DIMENSION DIASTOLIC: 5 CM (ref 3.9–5.3)
ECHO LV INTERNAL DIMENSION SYSTOLIC INDEX: 1.78 CM/M2
ECHO LV INTERNAL DIMENSION SYSTOLIC: 3.2 CM
ECHO LV ISOVOLUMETRIC RELAXATION TIME (IVRT): 71 MS
ECHO LV IVSD: 0.8 CM (ref 0.6–0.9)
ECHO LV MASS 2D: 146.8 G (ref 67–162)
ECHO LV MASS INDEX 2D: 81.6 G/M2 (ref 43–95)
ECHO LV POSTERIOR WALL DIASTOLIC: 0.9 CM (ref 0.6–0.9)
ECHO LV RELATIVE WALL THICKNESS RATIO: 0.36
ECHO LVOT AV VTI INDEX: 0.63
ECHO LVOT MEAN GRADIENT: 2 MMHG
ECHO LVOT PEAK GRADIENT: 4 MMHG
ECHO LVOT PEAK VELOCITY: 1 M/S
ECHO LVOT VTI: 20.6 CM
ECHO MV A VELOCITY: 1.2 M/S
ECHO MV E DECELERATION TIME (DT): 174 MS
ECHO MV E VELOCITY: 0.9 M/S
ECHO MV E/A RATIO: 0.75
ECHO MV E/E' LATERAL: 11.81
ECHO MV E/E' RATIO (AVERAGED): 11.27
ECHO MV E/E' SEPTAL: 10.73
ECHO MV LVOT VTI INDEX: 1.4
ECHO MV MAX VELOCITY: 1.2 M/S
ECHO MV MEAN GRADIENT: 6 MMHG
ECHO MV MEAN VELOCITY: 0.8 M/S
ECHO MV PEAK GRADIENT: 6 MMHG
ECHO MV VTI: 28.8 CM
ECHO PV MAX VELOCITY: 1.2 M/S
ECHO PV MEAN GRADIENT: 3 MMHG
ECHO PV MEAN VELOCITY: 0.8 M/S
ECHO PV PEAK GRADIENT: 6 MMHG
ECHO PV VTI: 23.3 CM
ECHO RA AREA 4C: 14.8 CM2
ECHO RA END SYSTOLIC VOLUME APICAL 4 CHAMBER INDEX BSA: 20 ML/M2
ECHO RA VOLUME: 36 ML
ECHO RIGHT VENTRICULAR SYSTOLIC PRESSURE (RVSP): 28 MMHG
ECHO RV BASAL DIMENSION: 3.3 CM
ECHO RV FREE WALL PEAK S': 19.3 CM/S
ECHO RV LONGITUDINAL DIMENSION: 6.7 CM
ECHO RV MID DIMENSION: 3 CM
ECHO RV TAPSE: 2.5 CM (ref 1.7–?)
ECHO TV REGURGITANT MAX VELOCITY: 2.5 M/S
ECHO TV REGURGITANT PEAK GRADIENT: 24 MMHG
EKG ATRIAL RATE: 65 BPM
EKG DIAGNOSIS: NORMAL
EKG P AXIS: 65 DEGREES
EKG P-R INTERVAL: 190 MS
EKG Q-T INTERVAL: 410 MS
EKG QRS DURATION: 78 MS
EKG QTC CALCULATION (BAZETT): 426 MS
EKG R AXIS: -28 DEGREES
EKG T AXIS: 22 DEGREES
EKG VENTRICULAR RATE: 65 BPM
EOSINOPHIL # BLD: 0.1 K/UL (ref 0–0.6)
EOSINOPHIL NFR BLD: 1.1 %
GFR SERPLBLD CREATININE-BSD FMLA CKD-EPI: >90 ML/MIN/{1.73_M2}
GLUCOSE BLD-MCNC: 88 MG/DL (ref 70–99)
GLUCOSE BLD-MCNC: 98 MG/DL (ref 70–99)
GLUCOSE SERPL-MCNC: 96 MG/DL (ref 70–99)
HCT VFR BLD AUTO: 37.7 % (ref 36–48)
HCT VFR BLD AUTO: 38.2 % (ref 36–48)
HCT VFR BLD AUTO: 39.6 % (ref 36–48)
HGB BLD-MCNC: 12.5 G/DL (ref 12–16)
HGB BLD-MCNC: 12.6 G/DL (ref 12–16)
HGB BLD-MCNC: 12.8 G/DL (ref 12–16)
LIPASE SERPL-CCNC: 23 U/L (ref 13–60)
LYMPHOCYTES # BLD: 1 K/UL (ref 1–5.1)
LYMPHOCYTES NFR BLD: 14.7 %
MCH RBC QN AUTO: 29.3 PG (ref 26–34)
MCHC RBC AUTO-ENTMCNC: 32.3 G/DL (ref 31–36)
MCV RBC AUTO: 90.7 FL (ref 80–100)
MONOCYTES # BLD: 0.4 K/UL (ref 0–1.3)
MONOCYTES NFR BLD: 6.5 %
NEUTROPHILS # BLD: 5.4 K/UL (ref 1.7–7.7)
NEUTROPHILS NFR BLD: 77.3 %
PERFORMED ON: NORMAL
PERFORMED ON: NORMAL
PLATELET # BLD AUTO: 215 K/UL (ref 135–450)
PMV BLD AUTO: 8.1 FL (ref 5–10.5)
POTASSIUM SERPL-SCNC: 4.2 MMOL/L (ref 3.5–5.1)
RBC # BLD AUTO: 4.36 M/UL (ref 4–5.2)
SODIUM SERPL-SCNC: 136 MMOL/L (ref 136–145)
WBC # BLD AUTO: 6.9 K/UL (ref 4–11)

## 2025-04-04 PROCEDURE — 80048 BASIC METABOLIC PNL TOTAL CA: CPT

## 2025-04-04 PROCEDURE — 93306 TTE W/DOPPLER COMPLETE: CPT

## 2025-04-04 PROCEDURE — 85025 COMPLETE CBC W/AUTO DIFF WBC: CPT

## 2025-04-04 PROCEDURE — 93010 ELECTROCARDIOGRAM REPORT: CPT | Performed by: INTERNAL MEDICINE

## 2025-04-04 PROCEDURE — 85014 HEMATOCRIT: CPT

## 2025-04-04 PROCEDURE — 6360000002 HC RX W HCPCS

## 2025-04-04 PROCEDURE — 85018 HEMOGLOBIN: CPT

## 2025-04-04 PROCEDURE — 2500000003 HC RX 250 WO HCPCS

## 2025-04-04 PROCEDURE — 36415 COLL VENOUS BLD VENIPUNCTURE: CPT

## 2025-04-04 PROCEDURE — 6370000000 HC RX 637 (ALT 250 FOR IP)

## 2025-04-04 PROCEDURE — 83690 ASSAY OF LIPASE: CPT

## 2025-04-04 PROCEDURE — 6360000002 HC RX W HCPCS: Performed by: INTERNAL MEDICINE

## 2025-04-04 RX ORDER — SUCRALFATE 1 G/1
1 TABLET ORAL 4 TIMES DAILY
Qty: 120 TABLET | Refills: 0 | Status: SHIPPED | OUTPATIENT
Start: 2025-04-04

## 2025-04-04 RX ORDER — CIPROFLOXACIN 500 MG/1
500 TABLET, FILM COATED ORAL 2 TIMES DAILY
Qty: 14 TABLET | Refills: 0 | Status: SHIPPED | OUTPATIENT
Start: 2025-04-04 | End: 2025-04-11

## 2025-04-04 RX ORDER — LACTOBACILLUS RHAMNOSUS GG 10B CELL
1 CAPSULE ORAL 2 TIMES DAILY
Qty: 60 CAPSULE | Refills: 1 | Status: SHIPPED | OUTPATIENT
Start: 2025-04-04

## 2025-04-04 RX ORDER — ONDANSETRON 4 MG/1
4 TABLET, ORALLY DISINTEGRATING ORAL 3 TIMES DAILY PRN
Qty: 21 TABLET | Refills: 0 | Status: SHIPPED | OUTPATIENT
Start: 2025-04-04

## 2025-04-04 RX ORDER — METRONIDAZOLE 500 MG/1
500 TABLET ORAL 3 TIMES DAILY
Qty: 21 TABLET | Refills: 0 | Status: SHIPPED | OUTPATIENT
Start: 2025-04-04 | End: 2025-04-11

## 2025-04-04 RX ORDER — POLYETHYLENE GLYCOL 3350 17 G/17G
17 POWDER, FOR SOLUTION ORAL DAILY
COMMUNITY
Start: 2025-04-04

## 2025-04-04 RX ORDER — SUCRALFATE 1 G/1
1 TABLET ORAL EVERY 6 HOURS SCHEDULED
Status: DISCONTINUED | OUTPATIENT
Start: 2025-04-04 | End: 2025-04-04 | Stop reason: HOSPADM

## 2025-04-04 RX ORDER — MORPHINE SULFATE 4 MG/ML
4 INJECTION, SOLUTION INTRAMUSCULAR; INTRAVENOUS EVERY 4 HOURS PRN
Status: DISCONTINUED | OUTPATIENT
Start: 2025-04-04 | End: 2025-04-04 | Stop reason: HOSPADM

## 2025-04-04 RX ADMIN — METRONIDAZOLE 500 MG: 500 INJECTION, SOLUTION INTRAVENOUS at 09:02

## 2025-04-04 RX ADMIN — Medication 40 MG: at 05:51

## 2025-04-04 RX ADMIN — CIPROFLOXACIN 400 MG: 2 INJECTION, SOLUTION INTRAVENOUS at 02:21

## 2025-04-04 RX ADMIN — MORPHINE SULFATE 4 MG: 4 INJECTION, SOLUTION INTRAMUSCULAR; INTRAVENOUS at 12:44

## 2025-04-04 RX ADMIN — ONDANSETRON 4 MG: 2 INJECTION, SOLUTION INTRAMUSCULAR; INTRAVENOUS at 12:42

## 2025-04-04 RX ADMIN — METOPROLOL SUCCINATE 50 MG: 50 TABLET, EXTENDED RELEASE ORAL at 09:03

## 2025-04-04 RX ADMIN — LEVOTHYROXINE SODIUM 100 MCG: 0.1 TABLET ORAL at 05:51

## 2025-04-04 RX ADMIN — CIPROFLOXACIN 400 MG: 2 INJECTION, SOLUTION INTRAVENOUS at 15:30

## 2025-04-04 ASSESSMENT — PAIN SCALES - GENERAL
PAINLEVEL_OUTOF10: 6
PAINLEVEL_OUTOF10: 0

## 2025-04-04 ASSESSMENT — PAIN DESCRIPTION - DESCRIPTORS: DESCRIPTORS: ACHING

## 2025-04-04 ASSESSMENT — PAIN DESCRIPTION - LOCATION: LOCATION: ABDOMEN

## 2025-04-04 ASSESSMENT — PAIN SCALES - WONG BAKER: WONGBAKER_NUMERICALRESPONSE: NO HURT

## 2025-04-04 ASSESSMENT — PAIN - FUNCTIONAL ASSESSMENT: PAIN_FUNCTIONAL_ASSESSMENT: ACTIVITIES ARE NOT PREVENTED

## 2025-04-04 ASSESSMENT — PAIN DESCRIPTION - ORIENTATION: ORIENTATION: UPPER

## 2025-04-04 NOTE — PROGRESS NOTES
Got a note at the desk to have gilbert gavin call and speak to the patients family member, they had some questions for her

## 2025-04-04 NOTE — FLOWSHEET NOTE
04/03/25 2002   Vital Signs   Temp 98.8 °F (37.1 °C)   Temp Source Oral   Pulse 69   Heart Rate Source Monitor   Respirations 16   /62   MAP (Calculated) 80   BP Location Right upper arm   BP Method Automatic   Patient Position Semi fowlers   Opioid-Induced Sedation   POSS Score 1   RASS   Ricks Agitation Sedation Scale (RASS) 0   Oxygen Therapy   SpO2 96 %   Pulse Oximetry Type Intermittent   Pulse Oximeter Device Mode Intermittent   Pulse Oximeter Device Location Left;Finger   O2 Device None (Room air)     Patient A+Ox4, vitals and assessments done at this time. Bed in lowest position, call light within reach.

## 2025-04-04 NOTE — DISCHARGE SUMMARY
Physician Discharge Summary     Patient ID:  Angie Giordano  [id number]  75 y.o.  1950    Admit date: 4/3/2025    Discharge date and time: No discharge date for patient encounter.     Admitting Physician: Shalini Camp DO     Discharge Physician: Leandra Nj MD    Admission Diagnoses: Shortness of breath [R06.02]  Diverticulitis of colon [K57.32]  Hematuria [R31.9]  Hematuria, unspecified type [R31.9]    Discharge Diagnoses: Principal Problem:    Hematuria  Active Problems:    Diverticulitis of colon    Shortness of breath    Hypomagnesemia  Resolved Problems:    * No resolved hospital problems. *      Admission Condition: {condition:61729}    Discharged Condition: {condition:86676}    Indication for Admission: ***    Hospital Course: ***     ** plan   Patient with abdominal pain, hematuria.   CT abdomen no  etiology does have some sigmoid diverticulitis.   On empiric antibiotics Cipro and Flagyl.     Patient seen and examined plan of care discussed in detail with patient and with patient's daughter at bedside.     -> CT abdomen showed diverticulitis she got IV Cipro and Flagyl here she will be discharged on the same and she will follow-up with her primary GI Dr. Win.   I will add Culturelle  - Patient does have chronic epigastric abdominal pain has been seeing Dr. Win and is scheduled for gastric emptying study in e as an outpatient next week keep up with that appointment.  She is already on Protonix twice daily we also put her on Carafate here this will be given on discharge  - Add Zofran as needed for nausea  For hematuria she was seen by urology here she does not have any recurrent hematuria plan is to follow-up with urology as an outpatient for cystoscopy next week.  Discussed with patient and patient's daughter they are concerned as patient's parent had bladder cancer.     Currently patient's abdominal pain is controlled she is tolerating her diet she wishes to go home she will follow-up with GI

## 2025-04-04 NOTE — CONSULTS
Gastroenterology Consult Note    Patient:   Angie Giordano   :    1950   Facility:   [facility]  Referring/PCP: Reza Dempsey III, MD  Date:     2025  Consultant:   RUFINO Mccann - CNP      Chief Complaint   Patient presents with    Abdominal Pain     Lower abd pain.  Blood in urine.        History of Present illness   Patient is a 74 yo female with pmx of HTN, GERD, and HLD who presented to ED 4/3 with c/o bloody urine. She reports onset was yesterday. She reports pain with urination. She reports ongoing epigastric pain which has been intermittent and gnawing for several months. She reports associated nausea without vomiting. She reports symptoms have worsened. She reports weight loss of 7 lbs in two weeks. She reports abdominal bloating and lack of appetite. She reports mild diarrhea, but typically has constipation for which she takes miralax. She denies NSAID, alcohol, tobacco, and marijuana use.She is established with Dr. Kevin. She was seen in office three days ago for epigastric pain for which an EGD was ordered. She reports compliance with daily PPI. Her last EGD was 2024 with gastritis. Her last colonoscopy was  with no polyps and moderate diverticulosis.     Past Medical History:   Diagnosis Date    Basal cell carcinoma 2016    nasal     GERD (gastroesophageal reflux disease)     Glaucoma     History of bleeding ulcers     admitted    Hyperlipidemia     Hypertension     Hypothyroidism     Kidney stone     Osteoarthritis     left knee    PONV (postoperative nausea and vomiting)     with general    Postmenopausal      Past Surgical History:   Procedure Laterality Date    APPENDECTOMY      CHOLECYSTECTOMY      COLONOSCOPY  10/24/2012    diverticulosis    CYSTOSCOPY Right 2017    right ureteroscopy, stone basket extraction, right ureteral stent    CYSTOSCOPY Right 2022    CYSTOSCOPY RIGHT STENT PLACEMENT performed by Bravo Perdomo

## 2025-04-04 NOTE — CARE COORDINATION
Discharge Planning Note:    Chart reviewed and it appears that patient has minimal needs for discharge at this time. Risk Score 5 %     Primary Care Physician is Reza Dempsey III, MD  Primary insurance is [employer]    Please notify case management if any discharge needs are identified.      Case management will continue to follow progress and update discharge plan as needed.

## 2025-04-04 NOTE — CONSULTS
Urology Consult Note      Reason for Consultation: gross hematuria     Chief Complaint: \"blood in my urine yesterday\"  HPI:  Angie is a 75 y.o. female with uro history including kidney stones and UTIs.  She reports the past few days she had urinary frequency and low abd discomfort.  Then yesterday she noticed bright red blood in her urine when she voided.  Denies flank pain. Denies fever or chills.  Came to ER for eval, CT AP showed no  abnormalities, but did show diverticulitis.  She was admitted for further care.  Today hematuria has resolved, but she still has some dysuria       Past Medical History:   Diagnosis Date    Basal cell carcinoma 2016    nasal     GERD (gastroesophageal reflux disease)     Glaucoma     History of bleeding ulcers 1970's    admitted    Hyperlipidemia     Hypertension     Hypothyroidism     Kidney stone     Osteoarthritis     left knee    PONV (postoperative nausea and vomiting)     with general    Postmenopausal        Past Surgical History:   Procedure Laterality Date    APPENDECTOMY      CHOLECYSTECTOMY  2008    COLONOSCOPY  10/24/2012    diverticulosis    CYSTOSCOPY Right 06/01/2017    right ureteroscopy, stone basket extraction, right ureteral stent    CYSTOSCOPY Right 11/8/2022    CYSTOSCOPY RIGHT STENT PLACEMENT performed by Bravo [facility]domo [place] [facility]    JOINT REPLACEMENT      KNEE ARTHROSCOPY      left    SHOULDER ARTHROSCOPY      right    TONSILLECTOMY AND ADENOIDECTOMY      UPPER GASTROINTESTINAL ENDOSCOPY  10/24/2012    EGD- normal    UPPER GASTROINTESTINAL ENDOSCOPY N/A 1/4/2024    EGD BIOPSY performed by Efrem Logan MD [place] [facility]       Medication List reviewed:     Current Facility-Administered Medications   Medication Dose Route Frequency Provider Last Rate Last Admin    sodium chloride flush 0.9 % injection 5-40 mL  5-40 mL IntraVENous 2 times per day Martha Morse PA   10 mL at 04/03/25 2006    sodium chloride flush 0.9 % injection 5-40 mL

## 2025-04-04 NOTE — FLOWSHEET NOTE
04/04/25 0802   Vital Signs   Temp 98.1 °F (36.7 °C)   Temp Source Oral   Pulse 71   Respirations 18   /71   MAP (Calculated) 94   BP Location Right upper arm   BP Method Automatic   Patient Position Semi fowlers   Pain Assessment   Pain Assessment None - Denies Pain   Oxygen Therapy   SpO2 94 %   O2 Device None (Room air)       Shift assessment complete. See flow sheet. Pt NPO   Patients head-toe complete, VS are logged, and active bowel sound noted in all four quadrants.    Pt sitting up in bed respirations easy and unlabored. No s/s of distress. Alert and oriented denies pain or SOB pt stable.     No further needs  noted at this time. Call light and bedside table are within reach. The bed is locked and is in the lowest position.        Kirby Garcia RN

## 2025-04-19 ASSESSMENT — ENCOUNTER SYMPTOMS
RHINORRHEA: 0
NAUSEA: 1
FACIAL SWELLING: 0
SHORTNESS OF BREATH: 0
SORE THROAT: 0
COLOR CHANGE: 0
ABDOMINAL PAIN: 1

## 2025-05-08 ENCOUNTER — HOSPITAL ENCOUNTER (OUTPATIENT)
Dept: NUCLEAR MEDICINE | Age: 75
Discharge: HOME | End: 2025-05-08
Payer: MEDICARE

## 2025-05-08 DIAGNOSIS — R10.13 ABDOMINAL PAIN, EPIGASTRIC: ICD-10-CM

## 2025-05-08 PROCEDURE — A9541 TC99M SULFUR COLLOID: HCPCS | Performed by: NURSE PRACTITIONER

## 2025-05-08 PROCEDURE — 3430000000 HC RX DIAGNOSTIC RADIOPHARMACEUTICAL: Performed by: NURSE PRACTITIONER

## 2025-05-08 PROCEDURE — 78264 GASTRIC EMPTYING IMG STUDY: CPT

## 2025-05-08 RX ADMIN — Medication 1 MILLICURIE: at 07:35

## 2025-08-20 ENCOUNTER — TRANSCRIBE ORDERS (OUTPATIENT)
Dept: ADMINISTRATIVE | Age: 75
End: 2025-08-20

## 2025-08-20 DIAGNOSIS — K76.0 FATTY LIVER: Primary | ICD-10-CM

## 2025-08-29 ENCOUNTER — HOSPITAL ENCOUNTER (OUTPATIENT)
Dept: ULTRASOUND IMAGING | Age: 75
Discharge: HOME | End: 2025-08-29
Attending: NURSE PRACTITIONER
Payer: MEDICARE

## 2025-08-29 DIAGNOSIS — K76.0 FATTY LIVER: ICD-10-CM

## 2025-08-29 PROCEDURE — 76981 USE PARENCHYMA: CPT

## (undated) DEVICE — YANKAUER,BULB TIP,W/O VENT,RIGID,STERILE: Brand: MEDLINE

## (undated) DEVICE — CIRCUIT ANES L72IN 3L BACT AND VIR FLTR EL CONN SGL LIMB

## (undated) DEVICE — UNIVERSAL SEALING VALVE 1-6FR  FOR INTRODUCING INSTRUMENTS FROM 1-6FR, PACK=5 PCS, STERILE, FOR SINGLE USE

## (undated) DEVICE — BOWL MED L 32OZ PLAS W/ MOLD GRAD EZ OPN PEEL PCH

## (undated) DEVICE — FORCEP BX STD CAP 240CM RAD JAW 4

## (undated) DEVICE — SOLUTION IRRIGATION STRL H2O 1000 ML UROMATIC CONTAINER

## (undated) DEVICE — TUBING, SUCTION, 3/16" X 10', STRAIGHT: Brand: MEDLINE

## (undated) DEVICE — NITINOL STONE RETRIEVAL BASKET: Brand: ZERO TIP

## (undated) DEVICE — CANNULA,OXY,ADULT,SUPERSOFT,W/7'TUB,SC: Brand: MEDLINE INDUSTRIES, INC.

## (undated) DEVICE — GLOVE ORANGE PI 7 1/2   MSG9075

## (undated) DEVICE — PREP SOL PVP IODINE 4%  4 OZ/BTL

## (undated) DEVICE — SYRINGE MED 10ML LUERLOCK TIP W/O SFTY DISP

## (undated) DEVICE — INVIEW CLEAR LEGGINGS: Brand: CONVERTORS

## (undated) DEVICE — BAG URIN STRL FOR URO CTCH SYS

## (undated) DEVICE — SOLUTION IV IRRIG 500ML 0.9% SODIUM CHL 2F7123

## (undated) DEVICE — ENDOSCOPIC KIT 2 12 FT OP4 DE2 GWN SYR

## (undated) DEVICE — BASIC CYSTO I-LF: Brand: MEDLINE INDUSTRIES, INC.

## (undated) DEVICE — GUIDEWIRE ENDOSCP L150CM DIA0.035IN TIP 3CM PTFE NIT

## (undated) DEVICE — CONMED SCOPE SAVER BITE BLOCK, 20X27 MM: Brand: SCOPE SAVER

## (undated) DEVICE — GOWN SIRUS NONREIN LG W/TWL: Brand: MEDLINE INDUSTRIES, INC.